# Patient Record
Sex: MALE | Race: ASIAN | NOT HISPANIC OR LATINO | URBAN - METROPOLITAN AREA
[De-identification: names, ages, dates, MRNs, and addresses within clinical notes are randomized per-mention and may not be internally consistent; named-entity substitution may affect disease eponyms.]

---

## 2023-05-16 ENCOUNTER — INPATIENT (INPATIENT)
Facility: HOSPITAL | Age: 83
LOS: 7 days | Discharge: HOME HEALTH | DRG: 603 | End: 2023-05-24
Attending: STUDENT IN AN ORGANIZED HEALTH CARE EDUCATION/TRAINING PROGRAM | Admitting: HOSPITALIST
Payer: MEDICARE

## 2023-05-16 VITALS
HEIGHT: 69 IN | OXYGEN SATURATION: 96 % | HEART RATE: 89 BPM | WEIGHT: 171.96 LBS | SYSTOLIC BLOOD PRESSURE: 119 MMHG | DIASTOLIC BLOOD PRESSURE: 71 MMHG | RESPIRATION RATE: 20 BRPM | TEMPERATURE: 100 F

## 2023-05-16 DIAGNOSIS — M10.9 GOUT, UNSPECIFIED: ICD-10-CM

## 2023-05-16 DIAGNOSIS — R35.0 FREQUENCY OF MICTURITION: ICD-10-CM

## 2023-05-16 DIAGNOSIS — L03.90 CELLULITIS, UNSPECIFIED: ICD-10-CM

## 2023-05-16 DIAGNOSIS — E03.9 HYPOTHYROIDISM, UNSPECIFIED: ICD-10-CM

## 2023-05-16 DIAGNOSIS — M79.672 PAIN IN LEFT FOOT: ICD-10-CM

## 2023-05-16 DIAGNOSIS — I10 ESSENTIAL (PRIMARY) HYPERTENSION: ICD-10-CM

## 2023-05-16 DIAGNOSIS — R60.0 LOCALIZED EDEMA: ICD-10-CM

## 2023-05-16 DIAGNOSIS — Z29.9 ENCOUNTER FOR PROPHYLACTIC MEASURES, UNSPECIFIED: ICD-10-CM

## 2023-05-16 DIAGNOSIS — Z86.69 PERSONAL HISTORY OF OTHER DISEASES OF THE NERVOUS SYSTEM AND SENSE ORGANS: Chronic | ICD-10-CM

## 2023-05-16 DIAGNOSIS — E78.5 HYPERLIPIDEMIA, UNSPECIFIED: ICD-10-CM

## 2023-05-16 LAB
APTT BLD: 30.8 SEC — SIGNIFICANT CHANGE UP (ref 27.5–35.5)
BASOPHILS # BLD AUTO: 0.03 K/UL — SIGNIFICANT CHANGE UP (ref 0–0.2)
BASOPHILS NFR BLD AUTO: 0.2 % — SIGNIFICANT CHANGE UP (ref 0–2)
CRP SERPL-MCNC: 192 MG/L — HIGH (ref 0–4)
EOSINOPHIL # BLD AUTO: 0 K/UL — SIGNIFICANT CHANGE UP (ref 0–0.5)
EOSINOPHIL NFR BLD AUTO: 0 % — SIGNIFICANT CHANGE UP (ref 0–6)
HCT VFR BLD CALC: 39.9 % — SIGNIFICANT CHANGE UP (ref 39–50)
HGB BLD-MCNC: 13.6 G/DL — SIGNIFICANT CHANGE UP (ref 13–17)
IMM GRANULOCYTES NFR BLD AUTO: 0.5 % — SIGNIFICANT CHANGE UP (ref 0–0.9)
INR BLD: 1.22 RATIO — HIGH (ref 0.88–1.16)
LYMPHOCYTES # BLD AUTO: 0.62 K/UL — LOW (ref 1–3.3)
LYMPHOCYTES # BLD AUTO: 4.7 % — LOW (ref 13–44)
MCHC RBC-ENTMCNC: 30.8 PG — SIGNIFICANT CHANGE UP (ref 27–34)
MCHC RBC-ENTMCNC: 34.1 GM/DL — SIGNIFICANT CHANGE UP (ref 32–36)
MCV RBC AUTO: 90.5 FL — SIGNIFICANT CHANGE UP (ref 80–100)
MONOCYTES # BLD AUTO: 1.02 K/UL — HIGH (ref 0–0.9)
MONOCYTES NFR BLD AUTO: 7.7 % — SIGNIFICANT CHANGE UP (ref 2–14)
NEUTROPHILS # BLD AUTO: 11.53 K/UL — HIGH (ref 1.8–7.4)
NEUTROPHILS NFR BLD AUTO: 86.9 % — HIGH (ref 43–77)
NRBC # BLD: 0 /100 WBCS — SIGNIFICANT CHANGE UP (ref 0–0)
NT-PROBNP SERPL-SCNC: 222 PG/ML — SIGNIFICANT CHANGE UP (ref 0–300)
PLATELET # BLD AUTO: 243 K/UL — SIGNIFICANT CHANGE UP (ref 150–400)
PROTHROM AB SERPL-ACNC: 14.1 SEC — HIGH (ref 10.5–13.4)
RBC # BLD: 4.41 M/UL — SIGNIFICANT CHANGE UP (ref 4.2–5.8)
RBC # FLD: 13.1 % — SIGNIFICANT CHANGE UP (ref 10.3–14.5)
TROPONIN T, HIGH SENSITIVITY RESULT: 23 NG/L — SIGNIFICANT CHANGE UP (ref 0–51)
URATE SERPL-MCNC: 8.2 MG/DL — SIGNIFICANT CHANGE UP (ref 3.4–8.8)
WBC # BLD: 13.26 K/UL — HIGH (ref 3.8–10.5)
WBC # FLD AUTO: 13.26 K/UL — HIGH (ref 3.8–10.5)

## 2023-05-16 PROCEDURE — 99223 1ST HOSP IP/OBS HIGH 75: CPT

## 2023-05-16 PROCEDURE — 73630 X-RAY EXAM OF FOOT: CPT | Mod: 26,LT

## 2023-05-16 PROCEDURE — 99285 EMERGENCY DEPT VISIT HI MDM: CPT | Mod: GC

## 2023-05-16 PROCEDURE — 93970 EXTREMITY STUDY: CPT | Mod: 26

## 2023-05-16 RX ORDER — ONDANSETRON 8 MG/1
4 TABLET, FILM COATED ORAL EVERY 8 HOURS
Refills: 0 | Status: DISCONTINUED | OUTPATIENT
Start: 2023-05-16 | End: 2023-05-24

## 2023-05-16 RX ORDER — METOPROLOL TARTRATE 50 MG
50 TABLET ORAL DAILY
Refills: 0 | Status: DISCONTINUED | OUTPATIENT
Start: 2023-05-16 | End: 2023-05-24

## 2023-05-16 RX ORDER — CEFAZOLIN SODIUM 1 G
1000 VIAL (EA) INJECTION ONCE
Refills: 0 | Status: COMPLETED | OUTPATIENT
Start: 2023-05-16 | End: 2023-05-16

## 2023-05-16 RX ORDER — LEVOTHYROXINE SODIUM 125 MCG
1 TABLET ORAL
Refills: 0 | DISCHARGE

## 2023-05-16 RX ORDER — LANOLIN ALCOHOL/MO/W.PET/CERES
3 CREAM (GRAM) TOPICAL AT BEDTIME
Refills: 0 | Status: DISCONTINUED | OUTPATIENT
Start: 2023-05-16 | End: 2023-05-24

## 2023-05-16 RX ORDER — DORZOLAMIDE HYDROCHLORIDE 20 MG/ML
1 SOLUTION/ DROPS OPHTHALMIC
Refills: 0 | DISCHARGE

## 2023-05-16 RX ORDER — COLCHICINE 0.6 MG
0.6 TABLET ORAL
Refills: 0 | Status: DISCONTINUED | OUTPATIENT
Start: 2023-05-16 | End: 2023-05-22

## 2023-05-16 RX ORDER — ACETAMINOPHEN 500 MG
650 TABLET ORAL EVERY 6 HOURS
Refills: 0 | Status: DISCONTINUED | OUTPATIENT
Start: 2023-05-16 | End: 2023-05-18

## 2023-05-16 RX ORDER — DORZOLAMIDE HYDROCHLORIDE 20 MG/ML
1 SOLUTION/ DROPS OPHTHALMIC
Refills: 0 | Status: DISCONTINUED | OUTPATIENT
Start: 2023-05-16 | End: 2023-05-24

## 2023-05-16 RX ORDER — ENOXAPARIN SODIUM 100 MG/ML
40 INJECTION SUBCUTANEOUS EVERY 24 HOURS
Refills: 0 | Status: DISCONTINUED | OUTPATIENT
Start: 2023-05-16 | End: 2023-05-24

## 2023-05-16 RX ORDER — AMLODIPINE BESYLATE 2.5 MG/1
1 TABLET ORAL
Refills: 0 | DISCHARGE

## 2023-05-16 RX ORDER — AMLODIPINE BESYLATE 2.5 MG/1
5 TABLET ORAL DAILY
Refills: 0 | Status: DISCONTINUED | OUTPATIENT
Start: 2023-05-16 | End: 2023-05-24

## 2023-05-16 RX ORDER — LEVOTHYROXINE SODIUM 125 MCG
100 TABLET ORAL DAILY
Refills: 0 | Status: DISCONTINUED | OUTPATIENT
Start: 2023-05-16 | End: 2023-05-24

## 2023-05-16 RX ORDER — METOPROLOL TARTRATE 50 MG
1 TABLET ORAL
Refills: 0 | DISCHARGE

## 2023-05-16 RX ORDER — CEFAZOLIN SODIUM 1 G
1000 VIAL (EA) INJECTION EVERY 8 HOURS
Refills: 0 | Status: DISCONTINUED | OUTPATIENT
Start: 2023-05-16 | End: 2023-05-22

## 2023-05-16 RX ORDER — COLCHICINE 0.6 MG
0.6 TABLET ORAL ONCE
Refills: 0 | Status: COMPLETED | OUTPATIENT
Start: 2023-05-16 | End: 2023-05-16

## 2023-05-16 RX ORDER — ATORVASTATIN CALCIUM 80 MG/1
40 TABLET, FILM COATED ORAL AT BEDTIME
Refills: 0 | Status: DISCONTINUED | OUTPATIENT
Start: 2023-05-16 | End: 2023-05-23

## 2023-05-16 RX ADMIN — Medication 100 MILLIGRAM(S): at 18:48

## 2023-05-16 RX ADMIN — Medication 0.6 MILLIGRAM(S): at 18:47

## 2023-05-16 NOTE — H&P ADULT - ASSESSMENT
82 year old male with a PMHx of hyperlipidemia, hypertension, hypothyroidism, and gout presented with right leg cellulitis, left intrapatellar bursitiis, and acute gout left foot.

## 2023-05-16 NOTE — H&P ADULT - PROBLEM SELECTOR PLAN 3
Increased urinary frequency, attributed to keflex. Pt states it has improved since stopping keflex one day ago  - obtain u/a to rule out UTI Likely in the setting of infection and acute gout.   EKG shows Q waves in leads III, AVL. No prior EKGs available to compare.  - will obtain baseline TTE   - no acute symptoms in regards to chest pain or SOB

## 2023-05-16 NOTE — ED PROVIDER NOTE - CLINICAL SUMMARY MEDICAL DECISION MAKING FREE TEXT BOX
82-year-old male with a swollen rt calf and  rt  knee  for 1 week and now presented with the left   great toe swelling redness and pain , most likely cellulitis of the   , right leg ,concern for gout of the left great toe    will obtain blood work ultrasounds of the   right calf. x-ray of the left foot blood work and reassess ZR

## 2023-05-16 NOTE — ED ADULT NURSE NOTE - OBJECTIVE STATEMENT
83 y/o male with PMH of HTN, HLD, arrives to the ER complaining of lower leg pain. Pt reports having bilateral leg pain since May 7th. Reports pain started on R foot first then migrate to the L foot. Pt went to , currently on Keflex for a week now for cellulitis, but reports severe pain, no being able to ambulate.   Pt denies SOB, chest pain, dizziness, N/V/D.  On assessment pt is well appearing, A&Ox4, speaking coherently, airway is patent, breathing spontaneously and unlabored. Skin is dry, warm. Abdomen is soft, no distended, no tender. Full ROM in all extremities, R foot redness and swelling, L foot less redness noted.   Patient undressed and placed into gown, side rails up with bed locked and in lowest position for safety. call bell within reach. [place] provided. Comfort and safety provided. will continue to reassess.

## 2023-05-16 NOTE — ED PROVIDER NOTE - PATIENT PORTAL LINK FT
You can access the FollowMyHealth Patient Portal offered by [facility] by registering at the following website: [email or web address]. By joining [employer]’s FollowMyHealth portal, you will also be able to view your health information using other applications (apps) compatible with our system.

## 2023-05-16 NOTE — H&P ADULT - PROBLEM SELECTOR PLAN 1
Partially improved with outpatient keflex  - continue with cefazolin 1 g q8hrs  - monitor for improvement

## 2023-05-16 NOTE — H&P ADULT - PROBLEM SELECTOR PLAN 6
- continue home synthroid 100 mcg daily - takes simvastatin at home; while admitted c/w atorvastatin

## 2023-05-16 NOTE — H&P ADULT - SKIN COMMENTS
+erythema about the left great toe and across the dorsal service of the left foot. there is tenderness about the right toe to palpation. Left sided intrapatellar bursitis

## 2023-05-16 NOTE — ED PROVIDER NOTE - ATTENDING CONTRIBUTION TO CARE
Refill  Zolpidem 10 mg tab     Pt states there are more refills he needs but was unsure on what    look at MDM

## 2023-05-16 NOTE — H&P ADULT - NSHPPHYSICALEXAM_GEN_ALL_CORE
Vital Signs Last 24 Hrs  T(C): 37.7 (16 May 2023 19:45), Max: 37.7 (16 May 2023 14:52)  T(F): 99.9 (16 May 2023 19:45), Max: 99.9 (16 May 2023 19:45)  HR: 77 (16 May 2023 19:45) (77 - 89)  BP: 127/67 (16 May 2023 19:45) (119/71 - 132/75)  BP(mean): --  RR: 18 (16 May 2023 19:45) (18 - 20)  SpO2: 99% (16 May 2023 19:45) (96% - 99%)    Parameters below as of 16 May 2023 19:45  Patient On (Oxygen Delivery Method): room air

## 2023-05-16 NOTE — ED PROVIDER NOTE - MUSCULOSKELETAL, MLM
tenderness and pain patella on rt knee ,swollen rt calf with mild redness ,red and swollen l great toe

## 2023-05-16 NOTE — ED ADULT NURSE NOTE - NSFALLUNIVINTERV_ED_ALL_ED
Bed/Stretcher in lowest position, wheels locked, appropriate side rails in place/Call bell, personal items and telephone in reach/Instruct patient to call for assistance before getting out of bed/chair/stretcher/Non-slip footwear applied when patient is off stretcher/[place] to call system/Physically safe environment - no spills, clutter or unnecessary equipment/Purposeful proactive rounding/Room/bathroom lighting operational, light cord in reach

## 2023-05-16 NOTE — ED ADULT NURSE REASSESSMENT NOTE - NS ED NURSE REASSESS COMMENT FT1
Report received from GISELA Clement. PT A,Ox4, denies any pain/discomfort at time, pt resting comfortably in stretcher, VSS, pt pending dispo, stretcher locked in lowest position, side rails up for safety.

## 2023-05-16 NOTE — H&P ADULT - PROBLEM SELECTOR PLAN 2
Gout flare in the setting of acute infection. Pt has no history of recurrent gout infections  - s/p colchicine 0.6mg PO x1  - continue with colchicine 0.6 mg PO BID

## 2023-05-16 NOTE — H&P ADULT - HISTORY OF PRESENT ILLNESS
82 year old male with a PMHx of hyperlipidemia, hypertension, and gout presented with the left and right leg swelling for 1 week.      ED interventions: cefazolin 1000 mg IV x1, colchicine 0.6mg PO x1. 82 year old male with a PMHx of hyperlipidemia, hypertension, hypothyroidism, and gout presented with leg swelling. About one week ago, pt states he was out gardening and taking care of his yard, felt he had a lot of dirt/leaves/etc stuck in his shoe in the process. Soon after, he developed swelling and erythema about the left calf area. He was treated with an outpatient course of keflex, which he took for about five days. The symptoms of the right leg improved but not completely. About 2-3 days prior to presentation, the patient felt that his left foot started to become erythematous and swollen as well. There is associated pain in the bilateral feet, to the point where it was difficult for him to ambulate comfortably. Generally speaking, the patient has no history of recurrent cellulitis or frequent gout flares.    ED interventions: cefazolin 1000 mg IV x1, colchicine 0.6mg PO x1.

## 2023-05-16 NOTE — H&P ADULT - PROBLEM SELECTOR PLAN 4
- continue home amlodipine 5mg daily  - continue home metoprolol succinate 50 mg daily Increased urinary frequency, attributed to keflex. Pt states it has improved since stopping keflex one day ago  - obtain u/a to rule out UTI

## 2023-05-16 NOTE — H&P ADULT - NSHPLABSRESULTS_GEN_ALL_CORE
LABS:                         13.6   13.26 )-----------( 243      ( 16 May 2023 16:33 )             39.9     05-16    135  |  98  |  31<H>  ----------------------------<  131<H>  4.3   |  23  |  1.70<H>    Ca    9.7      16 May 2023 16:33    TPro  8.6<H>  /  Alb  4.1  /  TBili  0.9  /  DBili  x   /  AST  33  /  ALT  35  /  AlkPhos  67  05-16    PT/INR - ( 16 May 2023 16:33 )   PT: 14.1 sec;   INR: 1.22 ratio         PTT - ( 16 May 2023 16:33 )  PTT:30.8 sec            Records reviewed from prior hospitalization.  Labs reviewed remarkable for - leukocytosis with increased neutrophils. BUN/Cr 31/1.70.  EKG personally reviewed   CXR personally reviewed LABS:                         13.6   13.26 )-----------( 243      ( 16 May 2023 16:33 )             39.9     05-16    135  |  98  |  31<H>  ----------------------------<  131<H>  4.3   |  23  |  1.70<H>    Ca    9.7      16 May 2023 16:33    TPro  8.6<H>  /  Alb  4.1  /  TBili  0.9  /  DBili  x   /  AST  33  /  ALT  35  /  AlkPhos  67  05-16    PT/INR - ( 16 May 2023 16:33 )   PT: 14.1 sec;   INR: 1.22 ratio         PTT - ( 16 May 2023 16:33 )  PTT:30.8 sec            Records reviewed from prior hospitalization.  Labs reviewed remarkable for - leukocytosis with increased neutrophils. BUN/Cr 31/1.70.  EKG personally reviewed - NSR LABS:                         13.6   13.26 )-----------( 243      ( 16 May 2023 16:33 )             39.9     05-16    135  |  98  |  31<H>  ----------------------------<  131<H>  4.3   |  23  |  1.70<H>    Ca    9.7      16 May 2023 16:33    TPro  8.6<H>  /  Alb  4.1  /  TBili  0.9  /  DBili  x   /  AST  33  /  ALT  35  /  AlkPhos  67  05-16    PT/INR - ( 16 May 2023 16:33 )   PT: 14.1 sec;   INR: 1.22 ratio         PTT - ( 16 May 2023 16:33 )  PTT:30.8 sec            Records reviewed from prior hospitalization.  Labs reviewed remarkable for - leukocytosis with increased neutrophils. BUN/Cr 31/1.70.  EKG personally reviewed - NSR, Q waves lead III, AVL

## 2023-05-16 NOTE — ED PROVIDER NOTE - CHIEF COMPLAINT
The patient is a 82y Female complaining of pain, lower leg. The patient is a 82  82 y old male  complaining of pain, lower leg.

## 2023-05-16 NOTE — H&P ADULT - PROBLEM SELECTOR PLAN 5
- takes simvastatin at home; while admitted c/w atorvastatin - continue home amlodipine 5mg daily  - continue home metoprolol succinate 50 mg daily

## 2023-05-16 NOTE — ED ADULT TRIAGE NOTE - CHIEF COMPLAINT QUOTE
Right lower leg cellulitis, treated with Keflex x 6 days. Reports improvement of inflammation but reports continued pain in the right lower leg and now also developed pain in the left lower leg.

## 2023-05-16 NOTE — ED PROVIDER NOTE - OBJECTIVE STATEMENT
82-year-old male with a history of hyperlipidemia, hypertension, gout, presented with the left and right leg swelling for 1 week    he was working in the garden last Wednesday  and noticed pain and swelling of the left calf as well on the left knee after he was kneeling on that leg  he was getting Keflex for 5 days for possible cellulitis and today noticed redness and swelling of the great toe of the left foot and difficulty walking   temperature is 98.5 no more. denies nausea or vomiting chest pain or shortness of breath

## 2023-05-16 NOTE — ED PROVIDER NOTE - RAPID ASSESSMENT
82 male history HTN HLD presents with leg pain and swelling.  Patient reports redness and swelling to right calf/foot that began 1 week ago while gardening, went to urgent care last Wednesday and has been taking Keflex for cellulitis for 6 days.  Reports worsening pain/swelling to RLE and now has swelling and pain into LLE.  Does also report tactile fever.  Denies prior DVT.  Denies recent flight/immobilization.  Non-smoker.  Reports active lifestyle.  Denies CP, palpitations, dizziness, BARNES.  RLE 2+ pitting warm/erythematous foot, LLE +1 pitting edema tender    IJose Alberto PA-C saw patient as a rapid assessment initially via telemedicine encounter. The rest of care to be performed by the primary ED team. Receiving team will follow up on labs, analgesia, any clinical imaging, and perform reassessment and disposition of the patient as clinically indicated. All decisions regarding the progression of care will be made at their discretion. 82 male history HTN HLD presents with leg pain and swelling.  Patient reports redness and swelling to right calf/foot that began 1 week ago while gardening, went to urgent care last Wednesday and has been taking Keflex for cellulitis for 6 days.  Reports worsening pain/swelling to RLE and now has swelling and pain into LLE.  Does also report tactile fever.  Denies prior DVT.  Denies recent flight/immobilization.  Non-smoker.  Reports active lifestyle.  Denies CP, palpitations, dizziness, BARNES.  RLE 2+ pitting warm/erythematous foot, LLE +1 pitting edema tender    Jose Alberto MENDOZA PA-C saw patient as a rapid assessment initially via telemedicine encounter. The rest of care to be performed by the primary ED team. Receiving team will follow up on labs, analgesia, any clinical imaging, and perform reassessment and disposition of the patient as clinically indicated. All decisions regarding the progression of care will be made at their discretion.    Attending MD Arias: This patient was seen and orders were placed by the PA as per our department's QPA model.  I was not consulted in regards to this patient although I was present and available in the Emergency Department to the PA.  Patient was to be sent to main ED for full medical evaluation and receiving team was to follow up on any labs, analgesia, clinical imaging ordered by the PA.  Any reassessment and disposition decisions were to be made by receiving team as clinically indicated, all decisions regarding the progression of care to be made at their discretion.  I did not perform a comprehensive history and physical on this patient.

## 2023-05-17 DIAGNOSIS — N17.9 ACUTE KIDNEY FAILURE, UNSPECIFIED: ICD-10-CM

## 2023-05-17 LAB
A1C WITH ESTIMATED AVERAGE GLUCOSE RESULT: 6.1 % — HIGH (ref 4–5.6)
ANION GAP SERPL CALC-SCNC: 14 MMOL/L — SIGNIFICANT CHANGE UP (ref 5–17)
APPEARANCE UR: CLEAR — SIGNIFICANT CHANGE UP
BACTERIA # UR AUTO: NEGATIVE — SIGNIFICANT CHANGE UP
BASOPHILS # BLD AUTO: 0.05 K/UL — SIGNIFICANT CHANGE UP (ref 0–0.2)
BASOPHILS NFR BLD AUTO: 0.5 % — SIGNIFICANT CHANGE UP (ref 0–2)
BILIRUB UR-MCNC: NEGATIVE — SIGNIFICANT CHANGE UP
BUN SERPL-MCNC: 29 MG/DL — HIGH (ref 7–23)
CALCIUM SERPL-MCNC: 9 MG/DL — SIGNIFICANT CHANGE UP (ref 8.4–10.5)
CHLORIDE SERPL-SCNC: 99 MMOL/L — SIGNIFICANT CHANGE UP (ref 96–108)
CO2 SERPL-SCNC: 21 MMOL/L — LOW (ref 22–31)
COLOR SPEC: SIGNIFICANT CHANGE UP
CREAT ?TM UR-MCNC: 118 MG/DL — SIGNIFICANT CHANGE UP
CREAT SERPL-MCNC: 1.45 MG/DL — HIGH (ref 0.5–1.3)
DIFF PNL FLD: ABNORMAL
EGFR: 48 ML/MIN/1.73M2 — LOW
EOSINOPHIL # BLD AUTO: 0.05 K/UL — SIGNIFICANT CHANGE UP (ref 0–0.5)
EOSINOPHIL NFR BLD AUTO: 0.5 % — SIGNIFICANT CHANGE UP (ref 0–6)
EPI CELLS # UR: 0 /HPF — SIGNIFICANT CHANGE UP
ERYTHROCYTE [SEDIMENTATION RATE] IN BLOOD: 88 MM/HR — HIGH (ref 0–20)
ESTIMATED AVERAGE GLUCOSE: 128 MG/DL — HIGH (ref 68–114)
GLUCOSE SERPL-MCNC: 97 MG/DL — SIGNIFICANT CHANGE UP (ref 70–99)
GLUCOSE UR QL: NEGATIVE — SIGNIFICANT CHANGE UP
HCT VFR BLD CALC: 36.3 % — LOW (ref 39–50)
HGB BLD-MCNC: 12.3 G/DL — LOW (ref 13–17)
HYALINE CASTS # UR AUTO: 0 /LPF — SIGNIFICANT CHANGE UP (ref 0–2)
IMM GRANULOCYTES NFR BLD AUTO: 0.4 % — SIGNIFICANT CHANGE UP (ref 0–0.9)
KETONES UR-MCNC: NEGATIVE — SIGNIFICANT CHANGE UP
LEUKOCYTE ESTERASE UR-ACNC: NEGATIVE — SIGNIFICANT CHANGE UP
LYMPHOCYTES # BLD AUTO: 1.56 K/UL — SIGNIFICANT CHANGE UP (ref 1–3.3)
LYMPHOCYTES # BLD AUTO: 14.2 % — SIGNIFICANT CHANGE UP (ref 13–44)
MAGNESIUM SERPL-MCNC: 2.3 MG/DL — SIGNIFICANT CHANGE UP (ref 1.6–2.6)
MCHC RBC-ENTMCNC: 30.3 PG — SIGNIFICANT CHANGE UP (ref 27–34)
MCHC RBC-ENTMCNC: 33.9 GM/DL — SIGNIFICANT CHANGE UP (ref 32–36)
MCV RBC AUTO: 89.4 FL — SIGNIFICANT CHANGE UP (ref 80–100)
MONOCYTES # BLD AUTO: 1.37 K/UL — HIGH (ref 0–0.9)
MONOCYTES NFR BLD AUTO: 12.5 % — SIGNIFICANT CHANGE UP (ref 2–14)
NEUTROPHILS # BLD AUTO: 7.9 K/UL — HIGH (ref 1.8–7.4)
NEUTROPHILS NFR BLD AUTO: 71.9 % — SIGNIFICANT CHANGE UP (ref 43–77)
NITRITE UR-MCNC: NEGATIVE — SIGNIFICANT CHANGE UP
NRBC # BLD: 0 /100 WBCS — SIGNIFICANT CHANGE UP (ref 0–0)
OSMOLALITY UR: 582 MOS/KG — SIGNIFICANT CHANGE UP (ref 300–900)
PH UR: 6 — SIGNIFICANT CHANGE UP (ref 5–8)
PHOSPHATE SERPL-MCNC: 3 MG/DL — SIGNIFICANT CHANGE UP (ref 2.5–4.5)
PLATELET # BLD AUTO: 225 K/UL — SIGNIFICANT CHANGE UP (ref 150–400)
POTASSIUM SERPL-MCNC: 3.9 MMOL/L — SIGNIFICANT CHANGE UP (ref 3.5–5.3)
POTASSIUM SERPL-SCNC: 3.9 MMOL/L — SIGNIFICANT CHANGE UP (ref 3.5–5.3)
POTASSIUM UR-SCNC: 63 MMOL/L — SIGNIFICANT CHANGE UP
PROT ?TM UR-MCNC: 25 MG/DL — HIGH (ref 0–12)
PROT UR-MCNC: ABNORMAL
PROT/CREAT UR-RTO: 0.2 RATIO — SIGNIFICANT CHANGE UP (ref 0–0.2)
RBC # BLD: 4.06 M/UL — LOW (ref 4.2–5.8)
RBC # FLD: 13.1 % — SIGNIFICANT CHANGE UP (ref 10.3–14.5)
RBC CASTS # UR COMP ASSIST: 3 /HPF — SIGNIFICANT CHANGE UP (ref 0–4)
SODIUM SERPL-SCNC: 134 MMOL/L — LOW (ref 135–145)
SODIUM UR-SCNC: 37 MMOL/L — SIGNIFICANT CHANGE UP
SP GR SPEC: 1.02 — SIGNIFICANT CHANGE UP (ref 1.01–1.02)
UROBILINOGEN FLD QL: NEGATIVE — SIGNIFICANT CHANGE UP
WBC # BLD: 10.97 K/UL — HIGH (ref 3.8–10.5)
WBC # FLD AUTO: 10.97 K/UL — HIGH (ref 3.8–10.5)
WBC UR QL: 0 /HPF — SIGNIFICANT CHANGE UP (ref 0–5)

## 2023-05-17 PROCEDURE — 93306 TTE W/DOPPLER COMPLETE: CPT | Mod: 26

## 2023-05-17 PROCEDURE — 99232 SBSQ HOSP IP/OBS MODERATE 35: CPT

## 2023-05-17 RX ORDER — SODIUM CHLORIDE 9 MG/ML
1000 INJECTION, SOLUTION INTRAVENOUS
Refills: 0 | Status: DISCONTINUED | OUTPATIENT
Start: 2023-05-17 | End: 2023-05-18

## 2023-05-17 RX ORDER — BNT162B2 ORIGINAL AND OMICRON BA.4/BA.5 .1125; .1125 MG/2.25ML; MG/2.25ML
0.3 INJECTION, SUSPENSION INTRAMUSCULAR ONCE
Refills: 0 | Status: DISCONTINUED | OUTPATIENT
Start: 2023-05-17 | End: 2023-05-24

## 2023-05-17 RX ORDER — CHLORHEXIDINE GLUCONATE 213 G/1000ML
1 SOLUTION TOPICAL
Refills: 0 | Status: DISCONTINUED | OUTPATIENT
Start: 2023-05-17 | End: 2023-05-24

## 2023-05-17 RX ADMIN — Medication 100 MICROGRAM(S): at 05:29

## 2023-05-17 RX ADMIN — Medication 100 MILLIGRAM(S): at 13:51

## 2023-05-17 RX ADMIN — ATORVASTATIN CALCIUM 40 MILLIGRAM(S): 80 TABLET, FILM COATED ORAL at 21:36

## 2023-05-17 RX ADMIN — Medication 100 MILLIGRAM(S): at 05:29

## 2023-05-17 RX ADMIN — SODIUM CHLORIDE 75 MILLILITER(S): 9 INJECTION, SOLUTION INTRAVENOUS at 18:54

## 2023-05-17 RX ADMIN — DORZOLAMIDE HYDROCHLORIDE 1 DROP(S): 20 SOLUTION/ DROPS OPHTHALMIC at 21:35

## 2023-05-17 RX ADMIN — DORZOLAMIDE HYDROCHLORIDE 1 DROP(S): 20 SOLUTION/ DROPS OPHTHALMIC at 08:41

## 2023-05-17 RX ADMIN — Medication 50 MILLIGRAM(S): at 05:29

## 2023-05-17 RX ADMIN — Medication 100 MILLIGRAM(S): at 21:37

## 2023-05-17 RX ADMIN — Medication 0.6 MILLIGRAM(S): at 21:36

## 2023-05-17 RX ADMIN — AMLODIPINE BESYLATE 5 MILLIGRAM(S): 2.5 TABLET ORAL at 05:29

## 2023-05-17 RX ADMIN — Medication 0.6 MILLIGRAM(S): at 05:29

## 2023-05-17 RX ADMIN — ENOXAPARIN SODIUM 40 MILLIGRAM(S): 100 INJECTION SUBCUTANEOUS at 05:29

## 2023-05-17 NOTE — PROGRESS NOTE ADULT - PROBLEM SELECTOR PLAN 1
Partially improved with outpatient keflex  - continue with cefazolin 1 g q8hrs  - improving overall  - discussed with ID - can switch to cefadroxil 500mg PO BID (renally adjusted) to complete total 7d course on 5/22 when ready for dc

## 2023-05-17 NOTE — ED ADULT NURSE REASSESSMENT NOTE - NS ED NURSE REASSESS COMMENT FT1
Pt A,Ox4, denies any pain at time, pt resting comfortably in stretcher, VSS, pt provided meal and drink, pending admission. Stretcher locked in lowest position, side rails up for safety.

## 2023-05-17 NOTE — CONSULT NOTE ADULT - SUBJECTIVE AND OBJECTIVE BOX
[facility] DIVISION OF INFECTIOUS DISEASES  TRAE Vargas S. Shah, Y. Patel, G. [facility]  [phone]  ([phone] - weekdays after 5pm and weekends)    OCTAVIO OTTO  82y, Male  [id number]    HPI:  Patient is a 82 year old male with a PMH of hyperlipidemia, hypertension, hypothyroidism, and gout who presented with leg swelling. Patient reports he was cleaning his backyard about 2 weeks ago and he had a lot of leaves, dirt and twigs around and some got stuck inside his shoe in the process of cleaning. He noted some swelling and erythema with pain in the R foot, he was given cephalexin 500mg PO Q6h for about 5 days and noted erythema resolved but edema persisted. Denies history of lower extremity edema prior to this. He states shortly after he noted swelling with erythema and pain around his great toe and forefoot area; states son is a doctor and felt it was likely gout. Patient reports no gout flare in years. He then noted that it was more swollen, erythematous and painful and states he also then started to have pain in both feet yesterday with difficulty ambulating and so he came to the ER. He denies any history of cellulitis in the past. Patient seen and examined at bedside this morning in the ER.   ROS: 14 point review of systems completed, pertinent positives and negatives as per HPI.    Allergies: No Known Drug Allergies  Shrimp (Hives)    PMH -- Hypertension  HLD (hyperlipidemia)  Gout  Hypothyroidism    PSH -- History of retinal detachment  FH -- noncontributory   Social History -- denies tobacco, alcohol or illicit drug use    Physical Exam--  Vital Signs Last 24 Hrs  T(F): 98.3 (17 May 2023 05:07), Max: 99.9 (16 May 2023 19:45)  HR: 70 (17 May 2023 05:07) (70 - 89)  BP: 118/66 (17 May 2023 05:07) (118/66 - 132/75)  RR: 18 (17 May 2023 05:07) (18 - 20)  SpO2: 94% (17 May 2023 05:07) (94% - 99%)  General: no acute distress  HEENT: NC/AT, EOMI, anicteric, neck supple  Lungs: Clear bilaterally without rales, wheezing or rhonchi  Heart: S1, S2 present, RRR. No murmur, rub or gallop.  Abdomen: Soft. Nondistended. Nontender. BS present.   Neuro: AAOx3, no obvious focal deficits   Extremities: No cyanosis or clubbing. B/l LE pitting edema.   Skin: Mild left great toe erythema with medial foot erythema with no TTP, RLE with no erythema, dry cracked skin on heels  Psychiatric: Appropriate affect and mood for situation.   Lines: PIV    Laboratory & Imaging Data--  CBC:                       12.3   10.97 )-----------( 225      ( 17 May 2023 07:21 )             36.3     WBC Count: 10.97 K/uL (05-17-23 @ 07:21)  WBC Count: 13.26 K/uL (05-16-23 @ 16:33)    CMP: 05-17    134<L>  |  99  |  29<H>  ----------------------------<  97  3.9   |  21<L>  |  1.45<H>    Ca    9.0      17 May 2023 07:21  Phos  3.0     05-17  Mg     2.3     05-17    TPro  8.6<H>  /  Alb  4.1  /  TBili  0.9  /  DBili  x   /  AST  33  /  ALT  35  /  AlkPhos  67  05-16    LIVER FUNCTIONS - ( 16 May 2023 16:33 )  Alb: 4.1 g/dL / Pro: 8.6 g/dL / ALK PHOS: 67 U/L / ALT: 35 U/L / AST: 33 U/L / GGT: x             Microbiology: reviewed    Radiology--reviewed  < from: [facility] Duplex Lower Ext Vein Scan, Bilat (05.16.23 @ 19:42) >  IMPRESSION:  No evidence of deep venous thrombosis in either lower extremity.    < end of copied text >  < from: Xray Foot AP + Lateral + Oblique, Left (05.16.23 @ 17:33) >    FINDINGS:  No acute fracture or dislocation.  Slight erosion of the medial aspect of the head of the first metatarsal   head and the base of the first proximal phalanx with associated soft   tissue prominence consistent with the history of gout. There is a large   spur at the lateral base of the first proximal phalanx. There is also   marked spurring at the Achilles insertion and mild spurring at the   plantar fascial origin. There is a bipartite tibial hallux sesamoid.    IMPRESSION:  Small erosions at the medial aspect of the first metatarsophalangeal   joint consistent with the history of gout.    < end of copied text >    Active Medications--  acetaminophen     Tablet .. 650 milliGRAM(s) Oral every 6 hours PRN  aluminum hydroxide/magnesium hydroxide/simethicone Suspension 30 milliLiter(s) Oral every 4 hours PRN  amLODIPine   Tablet 5 milliGRAM(s) Oral daily  atorvastatin 40 milliGRAM(s) Oral at bedtime  ceFAZolin   IVPB 1000 milliGRAM(s) IV Intermittent every 8 hours  colchicine 0.6 milliGRAM(s) Oral two times a day  dorzolamide 2% Ophthalmic Solution 1 Drop(s) Right EYE <User Schedule>  enoxaparin Injectable 40 milliGRAM(s) SubCutaneous every 24 hours  levothyroxine 100 MICROGram(s) Oral daily  melatonin 3 milliGRAM(s) Oral at bedtime PRN  metoprolol succinate ER 50 milliGRAM(s) Oral daily  ondansetron Injectable 4 milliGRAM(s) IV Push every 8 hours PRN    Current Antimicrobials:   ceFAZolin   IVPB 1000 milliGRAM(s) IV Intermittent every 8 hours    Prior/Completed Antimicrobials:  ceFAZolin   IVPB     [facility] DIV[facility] OF INFECTIOUS DISEASES  TRAE Vargas S. Shah, Y. Patel, G. [facility]  [phone]  ([phone] - weekdays after 5pm and weekends)    OCTAVIO OTTO  82y, Male  [id number]    HPI:  Patient is a 82 year old male with a PMH of hyperlipidemia, hypertension, hypothyroidism, and gout who presented with leg swelling. Patient reports he was cleaning his backyard about 2 weeks ago and he had a lot of leaves, dirt and twigs around and some got stuck inside his shoe in the process of cleaning. He noted some swelling and erythema with pain in the R foot, he was given cephalexin 500mg PO Q6h for about 5 days and noted erythema resolved but edema persisted. Denies history of lower extremity edema prior to this. He states shortly after he noted swelling with erythema and pain around his great toe and forefoot area; states son is a doctor and felt it was likely gout. Patient reports no gout flare in years. He then noted that it was more swollen, erythematous and painful and states he also then started to have pain in both feet yesterday with difficulty ambulating and so he came to the ER. He denies any history of cellulitis in the past. States he had some increased urinary frequency with no other gu symptoms while taking cephalexin, was going every 1.5-2h, and it resolved after he stopped taking the cephalexin a day ago. Patient seen and examined at bedside this morning in the ER.   ROS: 14 point review of systems completed, pertinent positives and negatives as per HPI.    Allergies: No Known Drug Allergies  Shrimp (Hives)    PMH -- Hypertension  HLD (hyperlipidemia)  Gout  Hypothyroidism    PSH -- History of retinal detachment  FH -- noncontributory   Social History -- denies tobacco, alcohol or illicit drug use    Physical Exam--  Vital Signs Last 24 Hrs  T(F): 98.3 (17 May 2023 05:07), Max: 99.9 (16 May 2023 19:45)  HR: 70 (17 May 2023 05:07) (70 - 89)  BP: 118/66 (17 May 2023 05:07) (118/66 - 132/75)  RR: 18 (17 May 2023 05:07) (18 - 20)  SpO2: 94% (17 May 2023 05:07) (94% - 99%)  General: no acute distress  HEENT: NC/AT, EOMI, anicteric, neck supple  Lungs: Clear bilaterally without rales, wheezing or rhonchi  Heart: S1, S2 present, RRR. No murmur, rub or gallop.  Abdomen: Soft. Nondistended. Nontender. BS present.   Neuro: AAOx3, no obvious focal deficits   Extremities: No cyanosis or clubbing. B/l LE pitting edema.   Skin: Mild left great toe erythema with medial foot erythema with no TTP, RLE with no erythema, dry cracked skin on heels  Psychiatric: Appropriate affect and mood for situation.   Lines: PIV    Laboratory & Imaging Data--  CBC:                       12.3   10.97 )-----------( 225      ( 17 May 2023 07:21 )             36.3     WBC Count: 10.97 K/uL (05-17-23 @ 07:21)  WBC Count: 13.26 K/uL (05-16-23 @ 16:33)    CMP: 05-17    134<L>  |  99  |  29<H>  ----------------------------<  97  3.9   |  21<L>  |  1.45<H>    Ca    9.0      17 May 2023 07:21  Phos  3.0     05-17  Mg     2.3     05-17    TPro  8.6<H>  /  Alb  4.1  /  TBili  0.9  /  DBili  x   /  AST  33  /  ALT  35  /  AlkPhos  67  05-16    LIVER FUNCTIONS - ( 16 May 2023 16:33 )  Alb: 4.1 g/dL / Pro: 8.6 g/dL / ALK PHOS: 67 U/L / ALT: 35 U/L / AST: 33 U/L / GGT: x             Microbiology: reviewed    Radiology--reviewed  < from: [facility] Duplex Lower Ext Vein Scan, Bilat (05.16.23 @ 19:42) >  IMPRESSION:  No evidence of deep venous thrombosis in either lower extremity.    < end of copied text >  < from: Xray Foot AP + Lateral + Oblique, Left (05.16.23 @ 17:33) >    FINDINGS:  No acute fracture or dislocation.  Slight erosion of the medial aspect of the head of the first metatarsal   head and the base of the first proximal phalanx with associated soft   tissue prominence consistent with the history of gout. There is a large   spur at the lateral base of the first proximal phalanx. There is also   marked spurring at the Achilles insertion and mild spurring at the   plantar fascial origin. There is a bipartite tibial hallux sesamoid.    IMPRESSION:  Small erosions at the medial aspect of the first metatarsophalangeal   joint consistent with the history of gout.    < end of copied text >    Active Medications--  acetaminophen     Tablet .. 650 milliGRAM(s) Oral every 6 hours PRN  aluminum hydroxide/magnesium hydroxide/simethicone Suspension 30 milliLiter(s) Oral every 4 hours PRN  amLODIPine   Tablet 5 milliGRAM(s) Oral daily  atorvastatin 40 milliGRAM(s) Oral at bedtime  ceFAZolin   IVPB 1000 milliGRAM(s) IV Intermittent every 8 hours  colchicine 0.6 milliGRAM(s) Oral two times a day  dorzolamide 2% Ophthalmic Solution 1 Drop(s) Right EYE <User Schedule>  enoxaparin Injectable 40 milliGRAM(s) SubCutaneous every 24 hours  levothyroxine 100 MICROGram(s) Oral daily  melatonin 3 milliGRAM(s) Oral at bedtime PRN  metoprolol succinate ER 50 milliGRAM(s) Oral daily  ondansetron Injectable 4 milliGRAM(s) IV Push every 8 hours PRN    Current Antimicrobials:   ceFAZolin   IVPB 1000 milliGRAM(s) IV Intermittent every 8 hours    Prior/Completed Antimicrobials:  ceFAZolin   IVPB

## 2023-05-17 NOTE — PATIENT PROFILE ADULT - FUNCTIONAL ASSESSMENT - BASIC MOBILITY 6.
3-calculated by average/Not able to assess (calculate score using [facility] averaging method)

## 2023-05-17 NOTE — CONSULT NOTE ADULT - ASSESSMENT
[facility]  Chart Reviewed-Full Consult to follow for any immediate concerns please feel free to contact us directly at [phone] and have us paged  Jeffry Allison MD   Patient is a 82 year old male with a PMH of hyperlipidemia, hypertension, hypothyroidism, and gout who presented withleg swelling with erythema and bilateral foot pain. Patient recently with RLE cellulitis after cleaning his backyard and was treated with cephalexin with resolution of erythema but persistent edema. He then developed L foot pain with erythema and swelling, thought to be gout. He also had pain in both feet with difficulty ambulating and so he came to the ER. Patient was admitted for L foot gout, LE cellulitis and L intrapatellar bursitis.     LLE gout flare with possible component of cellulitis   Leukocytosis likely due to above   Recent RLE cellulitis s/p cephalexin  B/l LE edema likely due to above   - b/l heels with some cracked dry skin; RLE with edema but no erythema  - L great toe with erythema and mild erythema -unable to rule out cellulitis  - had some urinary frequency with cephalexin which resolved after stopping it  - leukocytosis on admission downtrended now, afebrile  - started on cefazolin 1g IV Q8h -- continue for now   - on colchicine for gout per primary team  - continue to monitor -- if improve - can look at discharging on cefadroxil 500mg PO BID (renally adjusted) to complete total 7d course on 5/22 and follow up with [facility] as oupatient   - elevate b/l lower extremities   - monitor temps/WBC      D/w Dr. Maureen Allison M.D.  [facility], Division of Infectious Diseases  [phone]  After 5pm on weekdays and all day on weekends - please call [phone]

## 2023-05-18 LAB
ANION GAP SERPL CALC-SCNC: 12 MMOL/L — SIGNIFICANT CHANGE UP (ref 5–17)
BUN SERPL-MCNC: 23 MG/DL — SIGNIFICANT CHANGE UP (ref 7–23)
CALCIUM SERPL-MCNC: 8.6 MG/DL — SIGNIFICANT CHANGE UP (ref 8.4–10.5)
CHLORIDE SERPL-SCNC: 100 MMOL/L — SIGNIFICANT CHANGE UP (ref 96–108)
CO2 SERPL-SCNC: 21 MMOL/L — LOW (ref 22–31)
CREAT SERPL-MCNC: 1.29 MG/DL — SIGNIFICANT CHANGE UP (ref 0.5–1.3)
EGFR: 55 ML/MIN/1.73M2 — LOW
GLUCOSE SERPL-MCNC: 124 MG/DL — HIGH (ref 70–99)
HCT VFR BLD CALC: 37.1 % — LOW (ref 39–50)
HGB BLD-MCNC: 12 G/DL — LOW (ref 13–17)
MAGNESIUM SERPL-MCNC: 2.1 MG/DL — SIGNIFICANT CHANGE UP (ref 1.6–2.6)
MCHC RBC-ENTMCNC: 29.9 PG — SIGNIFICANT CHANGE UP (ref 27–34)
MCHC RBC-ENTMCNC: 32.3 GM/DL — SIGNIFICANT CHANGE UP (ref 32–36)
MCV RBC AUTO: 92.5 FL — SIGNIFICANT CHANGE UP (ref 80–100)
NRBC # BLD: 0 /100 WBCS — SIGNIFICANT CHANGE UP (ref 0–0)
PHOSPHATE SERPL-MCNC: 2.9 MG/DL — SIGNIFICANT CHANGE UP (ref 2.5–4.5)
PLATELET # BLD AUTO: 237 K/UL — SIGNIFICANT CHANGE UP (ref 150–400)
POTASSIUM SERPL-MCNC: 3.9 MMOL/L — SIGNIFICANT CHANGE UP (ref 3.5–5.3)
POTASSIUM SERPL-SCNC: 3.9 MMOL/L — SIGNIFICANT CHANGE UP (ref 3.5–5.3)
RBC # BLD: 4.01 M/UL — LOW (ref 4.2–5.8)
RBC # FLD: 12.9 % — SIGNIFICANT CHANGE UP (ref 10.3–14.5)
SODIUM SERPL-SCNC: 133 MMOL/L — LOW (ref 135–145)
UUN UR-MCNC: 1020 MG/DL — SIGNIFICANT CHANGE UP
WBC # BLD: 10.29 K/UL — SIGNIFICANT CHANGE UP (ref 3.8–10.5)
WBC # FLD AUTO: 10.29 K/UL — SIGNIFICANT CHANGE UP (ref 3.8–10.5)

## 2023-05-18 PROCEDURE — 99233 SBSQ HOSP IP/OBS HIGH 50: CPT

## 2023-05-18 PROCEDURE — 99221 1ST HOSP IP/OBS SF/LOW 40: CPT

## 2023-05-18 RX ORDER — SODIUM CHLORIDE 9 MG/ML
1000 INJECTION, SOLUTION INTRAVENOUS
Refills: 0 | Status: DISCONTINUED | OUTPATIENT
Start: 2023-05-18 | End: 2023-05-21

## 2023-05-18 RX ORDER — ACETAMINOPHEN 500 MG
975 TABLET ORAL EVERY 6 HOURS
Refills: 0 | Status: DISCONTINUED | OUTPATIENT
Start: 2023-05-18 | End: 2023-05-24

## 2023-05-18 RX ADMIN — CHLORHEXIDINE GLUCONATE 1 APPLICATION(S): 213 SOLUTION TOPICAL at 13:05

## 2023-05-18 RX ADMIN — ENOXAPARIN SODIUM 40 MILLIGRAM(S): 100 INJECTION SUBCUTANEOUS at 05:09

## 2023-05-18 RX ADMIN — Medication 0.6 MILLIGRAM(S): at 17:16

## 2023-05-18 RX ADMIN — DORZOLAMIDE HYDROCHLORIDE 1 DROP(S): 20 SOLUTION/ DROPS OPHTHALMIC at 21:14

## 2023-05-18 RX ADMIN — AMLODIPINE BESYLATE 5 MILLIGRAM(S): 2.5 TABLET ORAL at 05:11

## 2023-05-18 RX ADMIN — Medication 100 MILLIGRAM(S): at 21:14

## 2023-05-18 RX ADMIN — Medication 0.6 MILLIGRAM(S): at 05:09

## 2023-05-18 RX ADMIN — DORZOLAMIDE HYDROCHLORIDE 1 DROP(S): 20 SOLUTION/ DROPS OPHTHALMIC at 08:31

## 2023-05-18 RX ADMIN — Medication 50 MILLIGRAM(S): at 05:09

## 2023-05-18 RX ADMIN — Medication 100 MICROGRAM(S): at 05:09

## 2023-05-18 RX ADMIN — Medication 100 MILLIGRAM(S): at 05:09

## 2023-05-18 RX ADMIN — Medication 100 MILLIGRAM(S): at 13:06

## 2023-05-18 RX ADMIN — ATORVASTATIN CALCIUM 40 MILLIGRAM(S): 80 TABLET, FILM COATED ORAL at 21:14

## 2023-05-18 NOTE — PHYSICAL THERAPY INITIAL EVALUATION ADULT - ACTIVE RANGE OF MOTION EXAMINATION, REHAB EVAL
except LT FT; limited DF/PF 2/2 pain/bilateral upper extremity Active ROM was WFL (within functional limits)/bilateral  lower extremity Active ROM was WFL (within functional limits)

## 2023-05-18 NOTE — PHYSICAL THERAPY INITIAL EVALUATION ADULT - NAME OF DISCHARGE PLANNER
%, Z= 1.22)*   10/16/17 163 lb (73.9 kg) (91 %, Z= 1.32)*     * Growth percentiles are based on CDC 2-20 Years data. Body mass index is 23.92 kg/m². Physical Exam   Constitutional: She appears well-developed and well-nourished. No distress. HENT:   Head: Normocephalic. Mouth/Throat: Oropharynx is clear and moist. No oropharyngeal exudate. Cardiovascular: Normal rate, regular rhythm and normal heart sounds. No murmur heard. Pulmonary/Chest: Effort normal. She has decreased breath sounds in the right upper field, the right middle field, the right lower field, the left upper field and the left middle field. She has rales in the left lower field. Abdominal: Soft. She exhibits no distension. There is no tenderness. Musculoskeletal: She exhibits no edema. Skin: Skin is warm. No rash noted. Rapid flu positive for influenza B      Assessment:    1. Influenza, bronchopneumonia    - oseltamivir (TAMIFLU) 75 MG capsule; Take 1 capsule by mouth 2 times daily for 7 days  Dispense: 14 capsule; Refill: 0    2. Influenza B    - oseltamivir (TAMIFLU) 75 MG capsule; Take 1 capsule by mouth 2 times daily for 7 days  Dispense: 14 capsule; Refill: 0    3. Moderate persistent asthma with acute exacerbation  Start prednisone. She should do albuterol scheduled the next couple of days  - predniSONE (DELTASONE) 20 MG tablet; Take 3 tablets by mouth daily for 5 days  Dispense: 15 tablet; Refill: 0    4. Cough    - POCT Influenza A/B         Plan/Patient Instructions:    Patient Instructions   Influenza and Community acquired pneumonia  Start Tamiflu  Start prednisone  Continue Breo  Use your albuterol every 4 hours for the next 2 days       Return in about 3 months (around 3/19/2018) for asthma .        [facility] Lexi. 5/23- Crys Martinez

## 2023-05-18 NOTE — PHYSICAL THERAPY INITIAL EVALUATION ADULT - LEVEL OF INDEPENDENCE: GAIT, REHAB EVAL
with rolling walker progressing to independent. Ambulation with straight cane with min assist./supervision

## 2023-05-18 NOTE — PHYSICAL THERAPY INITIAL EVALUATION ADULT - ADDITIONAL COMMENTS
Patient reports he lives with his wife in a 2 level private house; 4 steps w/BL HR to enter and 6+4 steps (w/HR) to reach bedroom. Pt. reports he is independent in ADLs & IADLs w/o any AD; denies any hx of fall 2/2 LOB; reports his limiting factor is B/L foot pain LT>RT

## 2023-05-18 NOTE — PROGRESS NOTE ADULT - ASSESSMENT
Patient is a 82 year old male with a PMH of hyperlipidemia, hypertension, hypothyroidism, and gout who presented withleg swelling with erythema and bilateral foot pain. Patient recently with RLE cellulitis after cleaning his backyard and was treated with cephalexin with resolution of erythema but persistent edema. He then developed L foot pain with erythema and swelling, thought to be gout. He also had pain in both feet with difficulty ambulating and so he came to the ER. Patient was admitted for L foot gout, LE cellulitis and L intrapatellar bursitis.     LLE gout flare with possible component of cellulitis   Leukocytosis likely due to above   Recent RLE cellulitis s/p cephalexin  B/l LE edema likely due to above   - b/l heels with some cracked dry skin; RLE with edema but no erythema  - L great toe with erythema and mild erythema -unable to rule out cellulitis  - had some urinary frequency with cephalexin which resolved after stopping it  - leukocytosis on admission-resolved now, afebrile, overall improving  - continue on cefazolin 1g IV Q8h   - on colchicine for gout per Rheum and primary teams  - on discharge, can transition to cefadroxil 500mg PO BID (renally adjusted) to complete total 7d course on 5/22 and follow up with me as outpatient on 5/25  - encouraged to elevate b/l lower extremities   - monitor temps/WBC      Jeffry Allison M.D.  [facility], Division of Infectious Diseases  [phone]  After 5pm on weekdays and all day on weekends - please call [phone]

## 2023-05-18 NOTE — PHYSICAL THERAPY INITIAL EVALUATION ADULT - LEVEL OF INDEPENDENCE: SIT/STAND, REHAB EVAL
2/2 B/L foot pain LT >RT/unable to perform 2/2 B/L foot pain LT >RT. 5/23- contact guard progressing to independent/unable to perform

## 2023-05-18 NOTE — PHYSICAL THERAPY INITIAL EVALUATION ADULT - PERTINENT HX OF CURRENT PROBLEM, REHAB EVAL
82 year old male with a PMHx of hyperlipidemia, hypertension, hypothyroidism, and gout presented with leg swelling. About one week ago, pt states he was out gardening and taking care of his yard, felt he had a lot of dirt/leaves/etc stuck in his shoe in the process. Soon after, he developed swelling and erythema about the left calf area. He was treated with an outpatient course of keflex, which he took for about five days. The symptoms of the right leg improved but not completely. About 2-3 days prior to presentation, the patient felt that his left foot started to become erythematous and swollen as well. There is associated pain in the bilateral feet, to the point where it was difficult for him to ambulate comfortably. Generally speaking, the patient has no history of recurrent cellulitis or frequent gout flares. Hospital course: 5/16 X-ray foot LT side: small erosions at medial aspect of 1st MTP. 5/16 VA duplex: No DVT

## 2023-05-18 NOTE — CONSULT NOTE ADULT - SUBJECTIVE AND OBJECTIVE BOX
OCTAVIO OTTO  [id number]    HISTORY OF PRESENT ILLNESS:  82 year old male with a PMHx of hyperlipidemia, hypertension, hypothyroidism, and gout presented with leg swelling. About one week ago, pt states he was out gardening and taking care of his yard, felt he had a lot of dirt/leaves/etc stuck in his shoe in the process. Soon after, he developed swelling and erythema about the left calf area. He was treated with an outpatient course of keflex, which he took for about five days. The symptoms of the right leg improved but not completely. About 2-3 days prior to presentation, the patient felt that his left foot started to become erythematous and swollen as well. There is associated pain in the bilateral feet, to the point where it was difficult for him to ambulate comfortably. Generally speaking, the patient has no history of recurrent cellulitis or frequent gout flares.    ED interventions: cefazolin 1000 mg IV x1, colchicine 0.6mg PO x1. (23 21:51)    Rheumatology consulted for gout management. Hx as per above. Joint pain in Left big toe. Also with R knee pain from kneeling. Patient has hx of Gout which started 15 years ago. Last flare was about 8 years ago. Never on Urate Lowering therapy. Previous flares only affected R or L big toe. No hx of tophi. Denies alcohol use, endorses occasional red meat consumption. Notes that his pain is 80% less after two doses of Colchicine.      PAST MEDICAL & SURGICAL HISTORY:  Hypertension      HLD (hyperlipidemia)      Gout      Hypothyroidism      History of retinal detachment          Review of Systems:  Gen:  No fevers/chills, weight loss  HEENT: No blurry vision, no difficulty swallowing  CVS: No chest pain/palpitations  Resp: No SOB/wheezing  GI: No N/V/C/D/abdominal pain  MSK: L big toe pain 2/10; R knee pain  Skin: No new rashes  Neuro: No headaches    MEDICATIONS  (STANDING):  amLODIPine   Tablet 5 milliGRAM(s) Oral daily  atorvastatin 40 milliGRAM(s) Oral at bedtime  ceFAZolin   IVPB 1000 milliGRAM(s) IV Intermittent every 8 hours  chlorhexidine 2% Cloths 1 Application(s) Topical <User Schedule>  colchicine 0.6 milliGRAM(s) Oral two times a day  coronavirus bivalent (EUA) Booster Vaccine (PFIZER) 0.3 milliLiter(s) IntraMuscular once  dorzolamide 2% Ophthalmic Solution 1 Drop(s) Right EYE <User Schedule>  enoxaparin Injectable 40 milliGRAM(s) SubCutaneous every 24 hours  lactated ringers. 1000 milliLiter(s) (75 mL/Hr) IV Continuous <Continuous>  levothyroxine 100 MICROGram(s) Oral daily  metoprolol succinate ER 50 milliGRAM(s) Oral daily    MEDICATIONS  (PRN):  acetaminophen     Tablet .. 650 milliGRAM(s) Oral every 6 hours PRN Temp greater or equal to 38C (100.4F), Mild Pain (1 - 3)  aluminum hydroxide/magnesium hydroxide/simethicone Suspension 30 milliLiter(s) Oral every 4 hours PRN Dyspepsia  melatonin 3 milliGRAM(s) Oral at bedtime PRN Insomnia  ondansetron Injectable 4 milliGRAM(s) IV Push every 8 hours PRN Nausea and/or Vomiting      Allergies    No Known Drug Allergies  Shrimp (Hives)    Intolerances        PERTINENT MEDICATION HISTORY:    SOCIAL HISTORY: No alcohol or smoking use    FAMILY HISTORY:      Vital Signs Last 24 Hrs  T(C): 37.3 (18 May 2023 05:44), Max: 37.6 (17 May 2023 17:27)  T(F): 99.1 (18 May 2023 05:44), Max: 99.6 (17 May 2023 17:27)  HR: 78 (18 May 2023 05:44) (72 - 78)  BP: 133/67 (18 May 2023 05:44) (133/67 - 146/76)  BP(mean): --  RR: 18 (18 May 2023 05:44) (18 - 18)  SpO2: 97% (18 May 2023 05:44) (96% - 98%)    Parameters below as of 18 May 2023 05:44  Patient On (Oxygen Delivery Method): room air        Daily     Daily     Physical Exam:  General: No apparent distress  HEENT: EOMI, MMM  CVS: +S1/S2, RRR  Resp: CTA b/l  GI: Soft, NT/ND  MSK: L big toe without warmth or erythema but tender to deep palpation; Below right knee and near the area of tibial tuberosity is tender on palpation but no erythema, warmth or effusion is noted; OA changes of the hands  Neuro: AAOx3  Skin: no  rashes, no tophi    LABS:                        12.0   10.29 )-----------( 237      ( 18 May 2023 06:49 )             37.1     05-18    133<L>  |  100  |  23  ----------------------------<  124<H>  3.9   |  21<L>  |  1.29    Ca    8.6      18 May 2023 06:49  Phos  2.9     -  Mg     2.1     -18    TPro  8.6<H>  /  Alb  4.1  /  TBili  0.9  /  DBili  x   /  AST  33  /  ALT  35  /  AlkPhos  67  05-16    PT/INR - ( 16 May 2023 16:33 )   PT: 14.1 sec;   INR: 1.22 ratio         PTT - ( 16 May 2023 16:33 )  PTT:30.8 sec  Urinalysis Basic - ( 17 May 2023 16:10 )    Color: Light Yellow / Appearance: Clear / S.019 / pH: x  Gluc: x / Ketone: Negative  / Bili: Negative / Urobili: Negative   Blood: x / Protein: Trace / Nitrite: Negative   Leuk Esterase: Negative / RBC: 3 /hpf / WBC 0 /HPF   Sq Epi: x / Non Sq Epi: x / Bacteria: Negative    Uric Acid, Serum: 8.2 mg/dL (23 @ 17:46)     RADIOLOGY & ADDITIONAL STUDIES:  < from: VA Duplex Lower Ext Vein Scan, Bilat (23 @ 19:42) >  IMPRESSION:  No evidence of deep venous thrombosis in either lower extremity.      < end of copied text >  < from: Xray Foot AP + Lateral + Oblique, Left (23 @ 17:33) >  IMPRESSION:  Small erosions at the medial aspect of the first metatarsophalangeal   joint consistent with the history of gout.    --- End of Report ---      < end of copied text >

## 2023-05-18 NOTE — PHYSICAL THERAPY INITIAL EVALUATION ADULT - PLANNED THERAPY INTERVENTIONS, PT EVAL
Addended by: Erica Cohen on: 5/12/2023 11:43 AM     Modules accepted: Orders GOAL: Patient will be able to negotiate 16 steps in 2 weeks/gait training/ROM/strengthening/transfer training

## 2023-05-18 NOTE — CONSULT NOTE ADULT - ASSESSMENT
81 yo M with a PMHx of HLD, HTN and Gout admitted for Cellulitis treatment with IV abx. Also noted to have left big toe pain consistent with Gout. Responded well to Colchicine. Rheumatology consulted for Gout management.     #Gou flares/Podagra  Risk Factors: Infection, CKD,  Not on Urate Lowering therapy  Last attack 8-10 years ago  Uric Acid level 8.2  Xray finding of small erosion on 1st MTP most likely related to Gout  significantly improved on Colchicine    Plan  - would continue Colchicine 0.6 mg bid for 5 more days (creatinine clearance ok for this dosing)  - avoid NSAIDs due to age and CKD  - avoid steroids due to current Cellulitis (on abx)  - meets criteria for Urate Lowering therapy (ULT) due to erosion on Xray  - decision regarding ULT to be made outpatient during f/u with PCP or Rheumatology   - please send HLA [id number] lab test for Allopurinol hypersensitivity as patient is of  descent    To be discussed with Dr. Ramires, Attending    Nikhil Peraza MD  Rheumatology Fellow, PGY-5  Available on TEAMS           83 yo M with a PMHx of HLD, HTN and Gout admitted for Cellulitis treatment with IV abx. Also noted to have left big toe pain consistent with Gout. Responded well to Colchicine. Rheumatology consulted for Gout management.     #Gou flares/Podagra  Risk Factors: Infection, CKD,  Not on Urate Lowering therapy  Last attack 8-10 years ago  Uric Acid level 8.2  Xray finding of small erosion on 1st MTP most likely related to Gout  significantly improved on Colchicine    Plan  - recommend Colchicine 0.6 mg bid for a total course of 7 days  (creatinine clearance ok for this dosing)  - do not think his lateral foot pain is due to Gout; may consider inpatient vs. outpatient MRI if he continues to have pain on ambulation  - avoid NSAIDs due to age and CKD  - avoid steroids due to current Cellulitis (on abx)  - meets criteria for Urate Lowering therapy (ULT) due to erosion on Xray  - decision regarding ULT to be made outpatient  - f/u HLA [id number] lab test for Allopurinol hypersensitivity as patient is of  descent  - f/u with Dr. Ramires, Rheumatology outpatient     Discussed with Dr. Ramires, Attending    Nikhil Peraza MD  Rheumatology Fellow, PGY-5  Available on TEAMS

## 2023-05-19 LAB
ANION GAP SERPL CALC-SCNC: 13 MMOL/L — SIGNIFICANT CHANGE UP (ref 5–17)
BUN SERPL-MCNC: 22 MG/DL — SIGNIFICANT CHANGE UP (ref 7–23)
CALCIUM SERPL-MCNC: 9.3 MG/DL — SIGNIFICANT CHANGE UP (ref 8.4–10.5)
CHLORIDE SERPL-SCNC: 98 MMOL/L — SIGNIFICANT CHANGE UP (ref 96–108)
CO2 SERPL-SCNC: 23 MMOL/L — SIGNIFICANT CHANGE UP (ref 22–31)
CREAT SERPL-MCNC: 1.37 MG/DL — HIGH (ref 0.5–1.3)
EGFR: 52 ML/MIN/1.73M2 — LOW
GLUCOSE SERPL-MCNC: 108 MG/DL — HIGH (ref 70–99)
HCT VFR BLD CALC: 37.7 % — LOW (ref 39–50)
HGB BLD-MCNC: 12.6 G/DL — LOW (ref 13–17)
MAGNESIUM SERPL-MCNC: 2.1 MG/DL — SIGNIFICANT CHANGE UP (ref 1.6–2.6)
MCHC RBC-ENTMCNC: 30.2 PG — SIGNIFICANT CHANGE UP (ref 27–34)
MCHC RBC-ENTMCNC: 33.4 GM/DL — SIGNIFICANT CHANGE UP (ref 32–36)
MCV RBC AUTO: 90.4 FL — SIGNIFICANT CHANGE UP (ref 80–100)
NRBC # BLD: 0 /100 WBCS — SIGNIFICANT CHANGE UP (ref 0–0)
PHOSPHATE SERPL-MCNC: 3.2 MG/DL — SIGNIFICANT CHANGE UP (ref 2.5–4.5)
PLATELET # BLD AUTO: 260 K/UL — SIGNIFICANT CHANGE UP (ref 150–400)
POTASSIUM SERPL-MCNC: 4.1 MMOL/L — SIGNIFICANT CHANGE UP (ref 3.5–5.3)
POTASSIUM SERPL-SCNC: 4.1 MMOL/L — SIGNIFICANT CHANGE UP (ref 3.5–5.3)
RBC # BLD: 4.17 M/UL — LOW (ref 4.2–5.8)
RBC # FLD: 12.8 % — SIGNIFICANT CHANGE UP (ref 10.3–14.5)
SODIUM SERPL-SCNC: 134 MMOL/L — LOW (ref 135–145)
WBC # BLD: 8.04 K/UL — SIGNIFICANT CHANGE UP (ref 3.8–10.5)
WBC # FLD AUTO: 8.04 K/UL — SIGNIFICANT CHANGE UP (ref 3.8–10.5)

## 2023-05-19 PROCEDURE — 99233 SBSQ HOSP IP/OBS HIGH 50: CPT

## 2023-05-19 RX ADMIN — Medication 100 MILLIGRAM(S): at 21:08

## 2023-05-19 RX ADMIN — DORZOLAMIDE HYDROCHLORIDE 1 DROP(S): 20 SOLUTION/ DROPS OPHTHALMIC at 21:07

## 2023-05-19 RX ADMIN — ENOXAPARIN SODIUM 40 MILLIGRAM(S): 100 INJECTION SUBCUTANEOUS at 05:08

## 2023-05-19 RX ADMIN — Medication 0.6 MILLIGRAM(S): at 05:07

## 2023-05-19 RX ADMIN — ATORVASTATIN CALCIUM 40 MILLIGRAM(S): 80 TABLET, FILM COATED ORAL at 21:07

## 2023-05-19 RX ADMIN — DORZOLAMIDE HYDROCHLORIDE 1 DROP(S): 20 SOLUTION/ DROPS OPHTHALMIC at 09:17

## 2023-05-19 RX ADMIN — Medication 100 MILLIGRAM(S): at 05:08

## 2023-05-19 RX ADMIN — Medication 100 MILLIGRAM(S): at 14:00

## 2023-05-19 RX ADMIN — Medication 100 MICROGRAM(S): at 05:07

## 2023-05-19 RX ADMIN — CHLORHEXIDINE GLUCONATE 1 APPLICATION(S): 213 SOLUTION TOPICAL at 05:13

## 2023-05-19 RX ADMIN — AMLODIPINE BESYLATE 5 MILLIGRAM(S): 2.5 TABLET ORAL at 05:07

## 2023-05-19 RX ADMIN — Medication 50 MILLIGRAM(S): at 05:07

## 2023-05-19 RX ADMIN — Medication 0.6 MILLIGRAM(S): at 17:46

## 2023-05-19 NOTE — PROGRESS NOTE ADULT - ASSESSMENT
Patient is a 82 year old male with a PMH of hyperlipidemia, hypertension, hypothyroidism, and gout who presented withleg swelling with erythema and bilateral foot pain. Patient recently with RLE cellulitis after cleaning his backyard and was treated with cephalexin with resolution of erythema but persistent edema. He then developed L foot pain with erythema and swelling, thought to be gout. He also had pain in both feet with difficulty ambulating and so he came to the ER. Patient was admitted for L foot gout, LE cellulitis and L intrapatellar bursitis.     LLE gout flare with possible component of cellulitis   Leukocytosis likely due to above   Recent RLE cellulitis s/p cephalexin  B/l LE edema likely due to above   - b/l heels with some cracked dry skin; RLE with edema but no erythema  - L great toe with erythema and mild erythema -unable to rule out cellulitis  - had some urinary frequency with cephalexin which resolved after stopping it  - leukocytosis on admission-resolved now, afebrile, overall improved but still with pain  - continue on cefazolin 1g IV Q8h   - on colchicine for gout per Rheum and primary teams  - on discharge, can transition to cefadroxil 500mg PO BID (renally adjusted) to complete total 7d course on 5/22 and follow up with me as outpatient on 5/25  - encouraged to elevate b/l lower extremities   - monitor temps/WBC      Jeffry Allison M.D.  [facility], Division of Infectious Diseases  [phone]  After 5pm on weekdays and all day on weekends - please call [phone]

## 2023-05-20 LAB
ANION GAP SERPL CALC-SCNC: 12 MMOL/L — SIGNIFICANT CHANGE UP (ref 5–17)
BUN SERPL-MCNC: 21 MG/DL — SIGNIFICANT CHANGE UP (ref 7–23)
CALCIUM SERPL-MCNC: 9.3 MG/DL — SIGNIFICANT CHANGE UP (ref 8.4–10.5)
CHLORIDE SERPL-SCNC: 101 MMOL/L — SIGNIFICANT CHANGE UP (ref 96–108)
CO2 SERPL-SCNC: 23 MMOL/L — SIGNIFICANT CHANGE UP (ref 22–31)
CREAT SERPL-MCNC: 1.41 MG/DL — HIGH (ref 0.5–1.3)
EGFR: 50 ML/MIN/1.73M2 — LOW
GLUCOSE SERPL-MCNC: 108 MG/DL — HIGH (ref 70–99)
MAGNESIUM SERPL-MCNC: 2.1 MG/DL — SIGNIFICANT CHANGE UP (ref 1.6–2.6)
PHOSPHATE SERPL-MCNC: 3.5 MG/DL — SIGNIFICANT CHANGE UP (ref 2.5–4.5)
POTASSIUM SERPL-MCNC: 4.1 MMOL/L — SIGNIFICANT CHANGE UP (ref 3.5–5.3)
POTASSIUM SERPL-SCNC: 4.1 MMOL/L — SIGNIFICANT CHANGE UP (ref 3.5–5.3)
SODIUM SERPL-SCNC: 136 MMOL/L — SIGNIFICANT CHANGE UP (ref 135–145)

## 2023-05-20 PROCEDURE — 99233 SBSQ HOSP IP/OBS HIGH 50: CPT

## 2023-05-20 PROCEDURE — 70200 X-RAY EXAM OF EYE SOCKETS: CPT | Mod: 26

## 2023-05-20 RX ADMIN — Medication 0.6 MILLIGRAM(S): at 05:09

## 2023-05-20 RX ADMIN — Medication 100 MICROGRAM(S): at 05:09

## 2023-05-20 RX ADMIN — Medication 100 MILLIGRAM(S): at 13:26

## 2023-05-20 RX ADMIN — CHLORHEXIDINE GLUCONATE 1 APPLICATION(S): 213 SOLUTION TOPICAL at 05:10

## 2023-05-20 RX ADMIN — Medication 50 MILLIGRAM(S): at 05:09

## 2023-05-20 RX ADMIN — DORZOLAMIDE HYDROCHLORIDE 1 DROP(S): 20 SOLUTION/ DROPS OPHTHALMIC at 07:48

## 2023-05-20 RX ADMIN — ENOXAPARIN SODIUM 40 MILLIGRAM(S): 100 INJECTION SUBCUTANEOUS at 05:09

## 2023-05-20 RX ADMIN — DORZOLAMIDE HYDROCHLORIDE 1 DROP(S): 20 SOLUTION/ DROPS OPHTHALMIC at 21:31

## 2023-05-20 RX ADMIN — Medication 100 MILLIGRAM(S): at 21:31

## 2023-05-20 RX ADMIN — ATORVASTATIN CALCIUM 40 MILLIGRAM(S): 80 TABLET, FILM COATED ORAL at 21:31

## 2023-05-20 RX ADMIN — AMLODIPINE BESYLATE 5 MILLIGRAM(S): 2.5 TABLET ORAL at 05:09

## 2023-05-20 RX ADMIN — Medication 100 MILLIGRAM(S): at 05:11

## 2023-05-20 RX ADMIN — Medication 0.6 MILLIGRAM(S): at 17:31

## 2023-05-20 NOTE — PROGRESS NOTE ADULT - ASSESSMENT
Patient is a 82 year old male with a PMH of hyperlipidemia, hypertension, hypothyroidism, and gout who presented withleg swelling with erythema and bilateral foot pain. Patient recently with RLE cellulitis after cleaning his backyard and was treated with cephalexin with resolution of erythema but persistent edema. He then developed L foot pain with erythema and swelling, thought to be gout. He also had pain in both feet with difficulty ambulating and so he came to the ER. Patient was admitted for L foot gout, LE cellulitis and L intrapatellar bursitis.     LLE gout flare with possible component of cellulitis   Leukocytosis likely due to above - resolved   Recent RLE cellulitis s/p cephalexin  B/l LE edema likely due to above - improving   - LLE erythema resolved, edema improved; has some foot pain L>R  - had some urinary frequency with cephalexin which resolved after stopping it  - on colchicine for gout per Rheum and primary teams  - MR L foot pending to evaluate further etiology for pain   - continue on cefazolin 1g IV Q8h   - on discharge, transition to cefadroxil 500mg PO BID (renally adjusted) to complete total 7d course on 5/2   - can follow up with me as outpatient on 5/25  - encouraged to elevate b/l lower extremities   - monitor temps/WBC      Jeffry Allison M.D.  [facility], Division of Infectious Diseases  [phone]  After 5pm on weekdays and all day on weekends - please call [phone] Patient is a 82 year old male with a PMH of hyperlipidemia, hypertension, hypothyroidism, and gout who presented withleg swelling with erythema and bilateral foot pain. Patient recently with RLE cellulitis after cleaning his backyard and was treated with cephalexin with resolution of erythema but persistent edema. He then developed L foot pain with erythema and swelling, thought to be gout. He also had pain in both feet with difficulty ambulating and so he came to the ER. Patient was admitted for L foot gout, LE cellulitis and L intrapatellar bursitis.     LLE gout flare with possible component of cellulitis   Leukocytosis likely due to above - resolved   Recent RLE cellulitis s/p cephalexin  B/l LE edema likely due to above - improving   - LLE erythema resolved, edema improved; has some foot pain L>R more with weight bearing  - on colchicine for gout per Rheum and primary teams  - MR L foot pending to evaluate further etiology for pain   - continue on cefazolin 1g IV Q8h   - on discharge, transition to cefadroxil 500mg PO BID (renally adjusted) to complete total 7d course on 5/2   - can follow up with me as outpatient on 5/25  - encouraged to elevate b/l lower extremities   - monitor temps/WBC      Jeffry Allison M.D.  [facility], Division of Infectious Diseases  [phone]  After 5pm on weekdays and all day on weekends - please call [phone]

## 2023-05-21 LAB
ALBUMIN SERPL ELPH-MCNC: 3.5 G/DL — SIGNIFICANT CHANGE UP (ref 3.3–5)
ALP SERPL-CCNC: 72 U/L — SIGNIFICANT CHANGE UP (ref 40–120)
ALT FLD-CCNC: 63 U/L — HIGH (ref 10–45)
ANION GAP SERPL CALC-SCNC: 14 MMOL/L — SIGNIFICANT CHANGE UP (ref 5–17)
AST SERPL-CCNC: 62 U/L — HIGH (ref 10–40)
BASOPHILS # BLD AUTO: 0.06 K/UL — SIGNIFICANT CHANGE UP (ref 0–0.2)
BASOPHILS NFR BLD AUTO: 0.8 % — SIGNIFICANT CHANGE UP (ref 0–2)
BILIRUB SERPL-MCNC: 0.6 MG/DL — SIGNIFICANT CHANGE UP (ref 0.2–1.2)
BUN SERPL-MCNC: 25 MG/DL — HIGH (ref 7–23)
CALCIUM SERPL-MCNC: 9.4 MG/DL — SIGNIFICANT CHANGE UP (ref 8.4–10.5)
CHLORIDE SERPL-SCNC: 99 MMOL/L — SIGNIFICANT CHANGE UP (ref 96–108)
CO2 SERPL-SCNC: 22 MMOL/L — SIGNIFICANT CHANGE UP (ref 22–31)
CREAT SERPL-MCNC: 1.39 MG/DL — HIGH (ref 0.5–1.3)
EGFR: 51 ML/MIN/1.73M2 — LOW
EOSINOPHIL # BLD AUTO: 0.23 K/UL — SIGNIFICANT CHANGE UP (ref 0–0.5)
EOSINOPHIL NFR BLD AUTO: 2.9 % — SIGNIFICANT CHANGE UP (ref 0–6)
GLUCOSE SERPL-MCNC: 91 MG/DL — SIGNIFICANT CHANGE UP (ref 70–99)
HCT VFR BLD CALC: 38.7 % — LOW (ref 39–50)
HGB BLD-MCNC: 12.8 G/DL — LOW (ref 13–17)
IMM GRANULOCYTES NFR BLD AUTO: 0.8 % — SIGNIFICANT CHANGE UP (ref 0–0.9)
LYMPHOCYTES # BLD AUTO: 1.72 K/UL — SIGNIFICANT CHANGE UP (ref 1–3.3)
LYMPHOCYTES # BLD AUTO: 21.5 % — SIGNIFICANT CHANGE UP (ref 13–44)
MAGNESIUM SERPL-MCNC: 2.1 MG/DL — SIGNIFICANT CHANGE UP (ref 1.6–2.6)
MCHC RBC-ENTMCNC: 30.1 PG — SIGNIFICANT CHANGE UP (ref 27–34)
MCHC RBC-ENTMCNC: 33.1 GM/DL — SIGNIFICANT CHANGE UP (ref 32–36)
MCV RBC AUTO: 91.1 FL — SIGNIFICANT CHANGE UP (ref 80–100)
MONOCYTES # BLD AUTO: 0.84 K/UL — SIGNIFICANT CHANGE UP (ref 0–0.9)
MONOCYTES NFR BLD AUTO: 10.5 % — SIGNIFICANT CHANGE UP (ref 2–14)
NEUTROPHILS # BLD AUTO: 5.08 K/UL — SIGNIFICANT CHANGE UP (ref 1.8–7.4)
NEUTROPHILS NFR BLD AUTO: 63.5 % — SIGNIFICANT CHANGE UP (ref 43–77)
NRBC # BLD: 0 /100 WBCS — SIGNIFICANT CHANGE UP (ref 0–0)
PHOSPHATE SERPL-MCNC: 3.1 MG/DL — SIGNIFICANT CHANGE UP (ref 2.5–4.5)
PLATELET # BLD AUTO: 313 K/UL — SIGNIFICANT CHANGE UP (ref 150–400)
POTASSIUM SERPL-MCNC: 3.9 MMOL/L — SIGNIFICANT CHANGE UP (ref 3.5–5.3)
POTASSIUM SERPL-SCNC: 3.9 MMOL/L — SIGNIFICANT CHANGE UP (ref 3.5–5.3)
PROT SERPL-MCNC: 7.8 G/DL — SIGNIFICANT CHANGE UP (ref 6–8.3)
RBC # BLD: 4.25 M/UL — SIGNIFICANT CHANGE UP (ref 4.2–5.8)
RBC # FLD: 12.8 % — SIGNIFICANT CHANGE UP (ref 10.3–14.5)
SODIUM SERPL-SCNC: 135 MMOL/L — SIGNIFICANT CHANGE UP (ref 135–145)
WBC # BLD: 7.99 K/UL — SIGNIFICANT CHANGE UP (ref 3.8–10.5)
WBC # FLD AUTO: 7.99 K/UL — SIGNIFICANT CHANGE UP (ref 3.8–10.5)

## 2023-05-21 PROCEDURE — 99233 SBSQ HOSP IP/OBS HIGH 50: CPT

## 2023-05-21 RX ADMIN — CHLORHEXIDINE GLUCONATE 1 APPLICATION(S): 213 SOLUTION TOPICAL at 05:49

## 2023-05-21 RX ADMIN — Medication 50 MILLIGRAM(S): at 05:49

## 2023-05-21 RX ADMIN — Medication 0.6 MILLIGRAM(S): at 17:30

## 2023-05-21 RX ADMIN — Medication 100 MILLIGRAM(S): at 05:49

## 2023-05-21 RX ADMIN — Medication 100 MICROGRAM(S): at 05:49

## 2023-05-21 RX ADMIN — AMLODIPINE BESYLATE 5 MILLIGRAM(S): 2.5 TABLET ORAL at 05:49

## 2023-05-21 RX ADMIN — DORZOLAMIDE HYDROCHLORIDE 1 DROP(S): 20 SOLUTION/ DROPS OPHTHALMIC at 21:59

## 2023-05-21 RX ADMIN — Medication 100 MILLIGRAM(S): at 13:01

## 2023-05-21 RX ADMIN — Medication 0.6 MILLIGRAM(S): at 05:49

## 2023-05-21 RX ADMIN — Medication 100 MILLIGRAM(S): at 21:59

## 2023-05-21 RX ADMIN — ENOXAPARIN SODIUM 40 MILLIGRAM(S): 100 INJECTION SUBCUTANEOUS at 05:49

## 2023-05-21 RX ADMIN — DORZOLAMIDE HYDROCHLORIDE 1 DROP(S): 20 SOLUTION/ DROPS OPHTHALMIC at 08:21

## 2023-05-21 RX ADMIN — ATORVASTATIN CALCIUM 40 MILLIGRAM(S): 80 TABLET, FILM COATED ORAL at 21:59

## 2023-05-21 NOTE — PROGRESS NOTE ADULT - ASSESSMENT
Patient is a 82 year old male with a PMH of hyperlipidemia, hypertension, hypothyroidism, and gout who presented withleg swelling with erythema and bilateral foot pain. Patient recently with RLE cellulitis after cleaning his backyard and was treated with cephalexin with resolution of erythema but persistent edema. He then developed L foot pain with erythema and swelling, thought to be gout. He also had pain in both feet with difficulty ambulating and so he came to the ER. Patient was admitted for L foot gout, LE cellulitis and L intrapatellar bursitis.     LLE gout flare with possible component of cellulitis   Leukocytosis likely due to above - resolved   Recent RLE cellulitis s/p cephalexin  B/l LE edema likely due to above - improving   - LLE erythema resolved, edema much improved; has some foot pain L>R more with weight bearing  - on colchicine for gout per Rheum and primary teams  - MR L foot pending to evaluate further etiology for pain ?fracture   - continue on cefazolin 1g IV Q8h   - on discharge, transition to cefadroxil 500mg PO BID (renally adjusted) to complete total 7d course on 5/22   - can follow up with me as outpatient on 5/25  - encouraged to elevate b/l lower extremities       Jeffry Allison M.D.  [facility], Division of Infectious Diseases  [phone]  After 5pm on weekdays and all day on weekends - please call [phone]

## 2023-05-21 NOTE — PROGRESS NOTE ADULT - PROBLEM SELECTOR PLAN 9
- DVT ppx: lovenox subq  - GI ppx: none  - Diet: DASH  - spoke to son 5/19 - DVT ppx: lovenox subq  - GI ppx: none  - Diet: DASH  - spoke to son 5/21

## 2023-05-22 LAB
ALBUMIN SERPL ELPH-MCNC: 3.6 G/DL — SIGNIFICANT CHANGE UP (ref 3.3–5)
ALP SERPL-CCNC: 77 U/L — SIGNIFICANT CHANGE UP (ref 40–120)
ALT FLD-CCNC: 77 U/L — HIGH (ref 10–45)
ANION GAP SERPL CALC-SCNC: 16 MMOL/L — SIGNIFICANT CHANGE UP (ref 5–17)
AST SERPL-CCNC: 86 U/L — HIGH (ref 10–40)
BILIRUB SERPL-MCNC: 0.4 MG/DL — SIGNIFICANT CHANGE UP (ref 0.2–1.2)
BUN SERPL-MCNC: 23 MG/DL — SIGNIFICANT CHANGE UP (ref 7–23)
CALCIUM SERPL-MCNC: 9.7 MG/DL — SIGNIFICANT CHANGE UP (ref 8.4–10.5)
CHLORIDE SERPL-SCNC: 96 MMOL/L — SIGNIFICANT CHANGE UP (ref 96–108)
CO2 SERPL-SCNC: 26 MMOL/L — SIGNIFICANT CHANGE UP (ref 22–31)
CREAT SERPL-MCNC: 1.27 MG/DL — SIGNIFICANT CHANGE UP (ref 0.5–1.3)
EGFR: 56 ML/MIN/1.73M2 — LOW
GLUCOSE SERPL-MCNC: 160 MG/DL — HIGH (ref 70–99)
HAV IGM SER-ACNC: SIGNIFICANT CHANGE UP
HBV CORE IGM SER-ACNC: SIGNIFICANT CHANGE UP
HBV SURFACE AG SER-ACNC: SIGNIFICANT CHANGE UP
HCV AB S/CO SERPL IA: 0.14 S/CO — SIGNIFICANT CHANGE UP (ref 0–0.99)
HCV AB SERPL-IMP: SIGNIFICANT CHANGE UP
POTASSIUM SERPL-MCNC: 3.9 MMOL/L — SIGNIFICANT CHANGE UP (ref 3.5–5.3)
POTASSIUM SERPL-SCNC: 3.9 MMOL/L — SIGNIFICANT CHANGE UP (ref 3.5–5.3)
PROT SERPL-MCNC: 8.2 G/DL — SIGNIFICANT CHANGE UP (ref 6–8.3)
SODIUM SERPL-SCNC: 138 MMOL/L — SIGNIFICANT CHANGE UP (ref 135–145)

## 2023-05-22 PROCEDURE — 73720 MRI LWR EXTREMITY W/O&W/DYE: CPT | Mod: 26,LT

## 2023-05-22 PROCEDURE — 99233 SBSQ HOSP IP/OBS HIGH 50: CPT

## 2023-05-22 RX ADMIN — DORZOLAMIDE HYDROCHLORIDE 1 DROP(S): 20 SOLUTION/ DROPS OPHTHALMIC at 19:55

## 2023-05-22 RX ADMIN — Medication 100 MILLIGRAM(S): at 06:01

## 2023-05-22 RX ADMIN — CHLORHEXIDINE GLUCONATE 1 APPLICATION(S): 213 SOLUTION TOPICAL at 05:29

## 2023-05-22 RX ADMIN — ENOXAPARIN SODIUM 40 MILLIGRAM(S): 100 INJECTION SUBCUTANEOUS at 06:00

## 2023-05-22 RX ADMIN — ATORVASTATIN CALCIUM 40 MILLIGRAM(S): 80 TABLET, FILM COATED ORAL at 21:44

## 2023-05-22 RX ADMIN — AMLODIPINE BESYLATE 5 MILLIGRAM(S): 2.5 TABLET ORAL at 06:00

## 2023-05-22 RX ADMIN — Medication 0.6 MILLIGRAM(S): at 06:00

## 2023-05-22 RX ADMIN — Medication 50 MILLIGRAM(S): at 06:00

## 2023-05-22 RX ADMIN — Medication 100 MICROGRAM(S): at 06:00

## 2023-05-22 NOTE — PROGRESS NOTE ADULT - PROBLEM SELECTOR PLAN 9
- DVT ppx: lovenox subq  - GI ppx: none  - Diet: DASH    Dispo: needs PT re-eval pending MRI today, and u/s abd given rising LFts

## 2023-05-22 NOTE — PROGRESS NOTE ADULT - PROBLEM SELECTOR PLAN 1
Partially improved with outpatient keflex  - d/w ID Patient completed Ancef, d/c'ed abx   - improving overall

## 2023-05-22 NOTE — PROGRESS NOTE ADULT - ASSESSMENT
Patient is a 82 year old male with a PMH of hyperlipidemia, hypertension, hypothyroidism, and gout who presented withleg swelling with erythema and bilateral foot pain. Patient recently with RLE cellulitis after cleaning his backyard and was treated with cephalexin with resolution of erythema but persistent edema. He then developed L foot pain with erythema and swelling, thought to be gout. He also had pain in both feet with difficulty ambulating and so he came to the ER. Patient was admitted for L foot gout, LE cellulitis and L intrapatellar bursitis.     LLE gout flare with possible component of cellulitis   Leukocytosis likely due to above - resolved   Recent RLE cellulitis s/p cephalexin  B/l LE edema likely due to above - improving   - LLE erythema and b/l edema resolved, has some foot pain L>R more with weight bearin  - on colchicine for gout per Rheum and primary teams  - MR L foot pending to evaluate further etiology for pain ?fracture   - continue on cefazolin 1g IV Q8h -- complete course today 5/22   - encouraged to elevate b/l lower extremities       Jeffry Allison M.D.  [facility], Division of Infectious Diseases  [phone]  After 5pm on weekdays and all day on weekends - please call [phone]

## 2023-05-23 LAB
ALBUMIN SERPL ELPH-MCNC: 3.6 G/DL — SIGNIFICANT CHANGE UP (ref 3.3–5)
ALP SERPL-CCNC: 77 U/L — SIGNIFICANT CHANGE UP (ref 40–120)
ALT FLD-CCNC: 89 U/L — HIGH (ref 10–45)
ANION GAP SERPL CALC-SCNC: 15 MMOL/L — SIGNIFICANT CHANGE UP (ref 5–17)
AST SERPL-CCNC: 92 U/L — HIGH (ref 10–40)
BILIRUB SERPL-MCNC: 0.4 MG/DL — SIGNIFICANT CHANGE UP (ref 0.2–1.2)
BUN SERPL-MCNC: 24 MG/DL — HIGH (ref 7–23)
CALCIUM SERPL-MCNC: 9.7 MG/DL — SIGNIFICANT CHANGE UP (ref 8.4–10.5)
CHLORIDE SERPL-SCNC: 99 MMOL/L — SIGNIFICANT CHANGE UP (ref 96–108)
CO2 SERPL-SCNC: 24 MMOL/L — SIGNIFICANT CHANGE UP (ref 22–31)
CREAT SERPL-MCNC: 1.29 MG/DL — SIGNIFICANT CHANGE UP (ref 0.5–1.3)
EGFR: 55 ML/MIN/1.73M2 — LOW
GLUCOSE SERPL-MCNC: 97 MG/DL — SIGNIFICANT CHANGE UP (ref 70–99)
POTASSIUM SERPL-MCNC: 4 MMOL/L — SIGNIFICANT CHANGE UP (ref 3.5–5.3)
POTASSIUM SERPL-SCNC: 4 MMOL/L — SIGNIFICANT CHANGE UP (ref 3.5–5.3)
PROT SERPL-MCNC: 8.2 G/DL — SIGNIFICANT CHANGE UP (ref 6–8.3)
SODIUM SERPL-SCNC: 138 MMOL/L — SIGNIFICANT CHANGE UP (ref 135–145)

## 2023-05-23 PROCEDURE — 76700 US EXAM ABDOM COMPLETE: CPT | Mod: 26

## 2023-05-23 PROCEDURE — 99232 SBSQ HOSP IP/OBS MODERATE 35: CPT

## 2023-05-23 RX ADMIN — DORZOLAMIDE HYDROCHLORIDE 1 DROP(S): 20 SOLUTION/ DROPS OPHTHALMIC at 08:53

## 2023-05-23 RX ADMIN — Medication 50 MILLIGRAM(S): at 08:53

## 2023-05-23 RX ADMIN — Medication 100 MICROGRAM(S): at 05:24

## 2023-05-23 RX ADMIN — ENOXAPARIN SODIUM 40 MILLIGRAM(S): 100 INJECTION SUBCUTANEOUS at 05:25

## 2023-05-23 RX ADMIN — AMLODIPINE BESYLATE 5 MILLIGRAM(S): 2.5 TABLET ORAL at 05:24

## 2023-05-23 RX ADMIN — DORZOLAMIDE HYDROCHLORIDE 1 DROP(S): 20 SOLUTION/ DROPS OPHTHALMIC at 20:05

## 2023-05-23 NOTE — PROGRESS NOTE ADULT - PROBLEM SELECTOR PROBLEM 2
Acute gout of foot

## 2023-05-23 NOTE — DISCHARGE NOTE PROVIDER - CARE PROVIDER_API CALL
Lenin Ramires)  Internal Medicine; Rheumatology  [address] [facility], [facility] [address]  [place] [address]  Phone: [phone]  Fax: [phone]  Follow Up Time: 2 weeks   Lenin Ramires  Rheumatology  [address], [address]  [place] [address]  Phone: [phone]  Fax: [phone]  Follow Up Time: 2 weeks    Gabi Lawton  Phone: [phone]  Fax: (   )    -  Follow Up Time: 1 week

## 2023-05-23 NOTE — DISCHARGE NOTE PROVIDER - NSDCFUADDINST_GEN_ALL_CORE_FT
You had an EKG during your hospital admission that shows Q waves in leads III, AVL. No prior EKGs available to compare.  - Echo noted to have w/LVEF 70%, unable to assess diastolic dysfunction at this time, mild AS  - no acute symptoms in regards to chest pain or SOB  - Recommended to have repeat echo outpatient. Please follow up with PCP and/or cardiology

## 2023-05-23 NOTE — PROGRESS NOTE ADULT - PROBLEM SELECTOR PLAN 8
- continue home synthroid 100 mcg daily

## 2023-05-23 NOTE — DISCHARGE NOTE PROVIDER - NSDCCPCAREPLAN_GEN_ALL_CORE_FT
PRINCIPAL DISCHARGE DIAGNOSIS  Diagnosis: Cellulitis  Assessment and Plan of Treatment: Partially improved with outpatient keflex  Completed IV antibiotics        SECONDARY DISCHARGE DIAGNOSES  Diagnosis: Acute gout of foot  Assessment and Plan of Treatment: Gout flare in the setting of acute infection. Pt has no history of recurrent gout infections   Was on colchicine during admission but AR'ed for elevated LFTs.   - Acute hepatitis panel negative  Ultrasound of Abdomen performed   - f/u HLA-   - Needs outpatient uric acid for possible allopurinol initiation  - rheum consulted (holding off on steroids due to cellulitis)  - left foot also has left lateral point tenderness/pain and unable to bear weight, MRI with  Extensive soft tissue edema and enhancement involving the third digit with bony erosive changes centered at the distal interphalangeal joint.   Seen by ID for LLE gout flare with possible component of cellulitis   You had an elevated white blood cell count that has resolved and you completed antibiotics as per infectious disease  Follow up with PCP outpatient      Diagnosis: Hypertension  Assessment and Plan of Treatment: Continue to follow a low salt/sodium diet.  Perform physical activities as tolerated in consultation with your Primary Care Provider and physical therapist.  Take all medications as prescribed.  Follow up with your medical doctor for routine blood pressure monitoring at your next visit.  Notify your doctor if you have any of the following symptoms:  Dizziness, lightheadedness, blurry vision, headache, chest pain, or shortness of breath.      Diagnosis: Hypothyroidism  Assessment and Plan of Treatment: Follow up with PCP and/or endocrinology routinely for this.   Make sure you take synthroid on an empty stomach 1 hour prior to and not have any calcium, iron, or magnesium within 4 hours prior to or after taking synthroid.      Diagnosis: Hyperlipidemia  Assessment and Plan of Treatment: Low salt, low fat, low cholesterol, diabetic diet if appropriate  Continue medication as prescribed  Exercise, increase your activity level  Pt. verbalized an understanding of all instructions.      Diagnosis: Cellulitis  Assessment and Plan of Treatment: Partially improved with outpatient keflex  Completed IV antibiotics       PRINCIPAL DISCHARGE DIAGNOSIS  Diagnosis: Cellulitis  Assessment and Plan of Treatment: Partially improved with outpatient keflex  Completed IV antibiotics        SECONDARY DISCHARGE DIAGNOSES  Diagnosis: Acute gout of foot  Assessment and Plan of Treatment: Gout flare in the setting of acute infection. Pt has no history of recurrent gout infections   Was on colchicine during admission but NE'ed for elevated LFTs.   - Acute hepatitis panel negative  Ultrasound of Abdomen performed   -HLA- sent; still pending in lab on discharge day  - Needs outpatient uric acid for possible allopurinol initiation  - rheum consulted (holding off on steroids due to cellulitis)  - left foot also has left lateral point tenderness/pain and unable to bear weight, MRI with  Extensive soft tissue edema and enhancement involving the third digit with bony erosive changes centered at the distal interphalangeal joint.   Seen by ID for LLE gout flare with possible component of cellulitis   You had an elevated white blood cell count that has resolved and you completed antibiotics as per infectious disease  Follow up with PCP outpatient  Per Rheumatology, follow up outpatient as you meet criteria for Urate Lowering therapy (ULT) due to erosion on Xray      Diagnosis: Hypertension  Assessment and Plan of Treatment: Continue to follow a low salt/sodium diet.  Perform physical activities as tolerated in consultation with your Primary Care Provider and physical therapist.  Take all medications as prescribed.  Follow up with your medical doctor for routine blood pressure monitoring at your next visit.  Notify your doctor if you have any of the following symptoms:  Dizziness, lightheadedness, blurry vision, headache, chest pain, or shortness of breath.      Diagnosis: Hypothyroidism  Assessment and Plan of Treatment: Follow up with PCP and/or endocrinology routinely for this.   Make sure you take synthroid on an empty stomach 1 hour prior to and not have any calcium, iron, or magnesium within 4 hours prior to or after taking synthroid.      Diagnosis: Hyperlipidemia  Assessment and Plan of Treatment: Low salt, low fat, low cholesterol, diabetic diet if appropriate  Continue medication as prescribed  Exercise, increase your activity level  Pt. verbalized an understanding of all instructions.  You may resume your statin on discharge   Follow up with PCP      Diagnosis: Cellulitis  Assessment and Plan of Treatment: Partially improved with outpatient keflex  Completed IV antibiotics

## 2023-05-23 NOTE — PROGRESS NOTE ADULT - PROBLEM SELECTOR PLAN 2
Gout flare in the setting of acute infection. Pt has no history of recurrent gout infections   - s/p colchicine 0.6mg PO x1  - continue with colchicine 0.6 mg PO BID  - f/u HLA- ordered for tomorrow  - f/u outpatient uric acid for possible allopurinol initiation
Gout flare in the setting of acute infection. Pt has no history of recurrent gout infections   - s/p colchicine 0.6mg PO x1  - continue with colchicine 0.6 mg PO BID for total 7 days   - f/u HLA- (in lab)  - f/u outpatient uric acid for possible allopurinol initiation  - rheum recs appreciated (holding off on steroids due to cellulitis)  - left foot also has left lateral point tenderness/pain and unable to bear weight, f/u MRI to r/o fracture (scheduled for 4PM). patient and son in agreement with plan. PT re-eval pending results
Gout flare in the setting of acute infection. Pt has no history of recurrent gout infections   - s/p colchicine 0.6mg PO x1  - continue with colchicine 0.6 mg PO BID for total 7 days (to be completed 5/23)  - f/u HLA- (in lab)  - f/u outpatient uric acid for possible allopurinol initiation  - rheum recs appreciated (holding off on steroids due to cellulitis)  - left foot also has left lateral point tenderness/pain and unable to bear weight, f/u MRI to r/o fracture (called to get it expedited). patient and son in agreement with plan. PT re-eval pending results
Gout flare in the setting of acute infection. Pt has no history of recurrent gout infections   - noted to have transaminitis likely 2/2 recent Colchicine, R factor: mixed injury,   - Acute hepatitis panel: negative and u/s abd: unremarkable + cholestasias    - f/u HLA- (in lab)  - f/u outpatient uric acid for possible allopurinol initiation  - rheum recs appreciated (holding off on steroids due to cellulitis)  - PT rec home PT
Gout flare in the setting of acute infection. Pt has no history of recurrent gout infections   - s/p colchicine 0.6mg PO x1  - continue with colchicine 0.6 mg PO BID for total 7 days   - f/u HLA- (in lab)  - f/u outpatient uric acid for possible allopurinol initiation  - rheum recs appreciated (holding off on steroids due to cellulitis)  - left foot also has left lateral point tenderness/pain and unable to bear weight, will obtain MRI to r/o fracture. patient and son in agreement with plan
Gout flare in the setting of acute infection. Pt has no history of recurrent gout infections   - s/p colchicine 0.6mg PO x1  - continue with colchicine 0.6 mg PO BID  - f/u HLA- (ordered this AM, in lab)  - f/u outpatient uric acid for possible allopurinol initiation  - rheum recs appreciated (holding off on steroids due to cellulitis)
Gout flare in the setting of acute infection. Pt has no history of recurrent gout infections   - noted to have transaminitis, d/c'ed Colchicine, R factor: mixed injury   - check Acute hepatitis panel and u/s abd, monitor BMP    - f/u HLA- (in lab)  - f/u outpatient uric acid for possible allopurinol initiation  - rheum recs appreciated (holding off on steroids due to cellulitis)  - left foot also has left lateral point tenderness/pain and unable to bear weight, f/u MRI to r/o fracture (called to get it expedited). patient and son in agreement with plan. PT re-eval pending results

## 2023-05-23 NOTE — PROGRESS NOTE ADULT - PROBLEM SELECTOR PLAN 1
Partially improved with outpatient keflex  - d/w ID Patient completed Ancef, d/c'ed abx   - improving overall  - MRI foot posttraumatic/ infection or inflammatory: no fractures noted, incidental finding of liner foreign body of 2nd digit, however on exam foot is wnl, no trauma, lesion, foregin body notified, on personal review of MRI unclear if there is a foreign body in the 2nd digit. Patient does not have pain at the location of the MRI finding. Partially improved with outpatient keflex  - d/w ID Patient completed Ancef, d/c'ed abx   - improving overall  - MRI foot posttraumatic/ infection or inflammatory: no fractures noted, incidental finding of liner foreign body of 2nd digit, however on exam with nurse Estephanie Lee at bedside foot is wnl, no trauma, lesion, foregin body noted, on personal review of MRI unclear if there is a foreign body in the 2nd digit. Patient does not have pain at the location of the MRI finding.

## 2023-05-23 NOTE — DISCHARGE NOTE PROVIDER - NSDCMRMEDTOKEN_GEN_ALL_CORE_FT
amLODIPine 5 mg oral tablet: 1 tab(s) orally once a day  dorzolamide 2% ophthalmic solution: 1 in the right eye 2 times a day  levothyroxine 100 mcg (0.1 mg) oral capsule: 1 tab(s) orally once a day  simvastatin 40 mg oral tablet: 1 orally once a day  Toprol-XL 50 mg oral tablet, extended release: 1 orally once a day   amLODIPine 5 mg oral tablet: 1 tab(s) orally once a day  dorzolamide 2% ophthalmic solution: 1 in the right eye 2 times a day  levothyroxine 100 mcg (0.1 mg) oral capsule: 1 tab(s) orally once a day  Toprol-XL 50 mg oral tablet, extended release: 1 orally once a day   amLODIPine 5 mg oral tablet: 1 tab(s) orally once a day  dorzolamide 2% ophthalmic solution: 1 in the right eye 2 times a day  levothyroxine 100 mcg (0.1 mg) oral capsule: 1 tab(s) orally once a day  simvastatin 40 mg oral tablet: 1 orally once a day (at bedtime)  Toprol-XL 50 mg oral tablet, extended release: 1 orally once a day

## 2023-05-23 NOTE — DISCHARGE NOTE NURSING/CASE MANAGEMENT/SOCIAL WORK - PATIENT PORTAL LINK FT
You can access the FollowMyHealth Patient Portal offered by [facility] by registering at the following website: [email or web address]. By joining [facility]’s FollowMyHealth portal, you will also be able to view your health information using other applications (apps) compatible with our system.

## 2023-05-23 NOTE — PROGRESS NOTE ADULT - PROBLEM SELECTOR PLAN 4
likely from decreased po intake, overall improving  -improving with IVF  -strict Is/Os  -avoid nephrotoxic agents  -renally dose all medications
likely from decreased po intake, overall improving  -improving with IVF  -strict Is/Os  -avoid nephrotoxic agents  -renally dose all medications
resolved   -strict Is/Os  -avoid nephrotoxic agents  -renally dose all medications
resolved   -strict Is/Os  -avoid nephrotoxic agents  -renally dose all medications
likely from decreased po intake, overall improving  -Obtain Urine studies  -start ivf  -strict Is/Os  -avoid nephrotoxic agents  -renally dose all medications
likely from decreased po intake, overall improving  -improving with IVF  -strict Is/Os  -avoid nephrotoxic agents  -renally dose all medications
likely from decreased po intake, overall improving  -improving with IVF  -strict Is/Os  -avoid nephrotoxic agents  -renally dose all medications

## 2023-05-23 NOTE — PROGRESS NOTE ADULT - PROBLEM SELECTOR PROBLEM 5
Urinary frequency

## 2023-05-23 NOTE — PROGRESS NOTE ADULT - PROBLEM SELECTOR PROBLEM 4
CATHERINE (acute kidney injury)

## 2023-05-23 NOTE — DISCHARGE NOTE PROVIDER - NSDCFUADDAPPT_GEN_ALL_CORE_FT
APPTS ARE READY TO BE MADE: [ ] YES    Best Family or Patient Contact (if needed):    Additional Information about above appointments (if needed):    1: PCP  2: Rheumatology  3:     Other comments or requests:    APPTS ARE READY TO BE MADE: [X] YES    Best Family or Patient Contact (if needed):    Additional Information about above appointments (if needed):    1: PCP  2: Rheumatology  3:     Other comments or requests:    APPTS ARE READY TO BE MADE: [X] YES    Best Family or Patient Contact (if needed):    Additional Information about above appointments (if needed):    1: PCP  2: Rheumatology  3:     Other comments or requests:   Patient advised they currently have a Rheumatology specialist that they will follow up with.   Patient was previously scheduled with Dr. Santana.

## 2023-05-23 NOTE — PROGRESS NOTE ADULT - NSPROGADDITIONALINFOA_GEN_ALL_CORE
d/w acp    Thuy Reddy MD  [facility] Division of Hospital Medicine  Available via MS Teams  Pager: [phone]
time spent reviewing prior charts, meds, discussing plan with patient= 55 min     d/w Medicine ACP Layla
d/w acp    Thuy Reddy MD  [facility] Division of Hospital Medicine  Available via MS Teams  Pager: [phone]
d/w acp    Thuy Reddy MD  [facility] Division of Hospital Medicine  Available via MS Teams  Pager: [phone]
d/w ID    Thuy Reddy MD  [facility] Division of Hospital Medicine  Available via MS Teams  Pager: [phone]
d/w acp    Thuy Reddy MD  [facility] Division of Hospital Medicine  Available via MS Teams  Pager: [phone]
time spent reviewing prior charts, meds, discussing plan with patient= 40 min    d/w Medicine ACP nic

## 2023-05-23 NOTE — CHART NOTE - NSCHARTNOTEFT_GEN_A_CORE
Patient requires a rolling walker for d/c to home due to diagnosis of Gout.   RW RX provided.   Refer to PT notes for further details.     SKYLAR BañuelosC  [id number]

## 2023-05-23 NOTE — DISCHARGE NOTE NURSING/CASE MANAGEMENT/SOCIAL WORK - NSDCFUADDAPPT_GEN_ALL_CORE_FT
APPTS ARE READY TO BE MADE: [ ] YES    Best Family or Patient Contact (if needed):    Additional Information about above appointments (if needed):    1: PCP  2: Rheumatology  3:     Other comments or requests:

## 2023-05-23 NOTE — DISCHARGE NOTE PROVIDER - HOSPITAL COURSE
82 year old male with a PMHx of hyperlipidemia, hypertension, hypothyroidism, and gout presented with right leg cellulitis, left intrapatellar bursitiis, and acute gout left foot.  Cellulitis.   Partially improved with outpatient keflex  - d/w ID Patient completed Ancef, d/c'ed abx   - improving overall.    Acute gout of foot.   Gout flare in the setting of acute infection. Pt has no history of recurrent gout infections   - noted to have transaminitis, d/c'ed Colchicine, R factor: mixed injury   - Acute hepatitis panel negative u/s abd _____   - f/u HLA-   - f/u outpatient uric acid for possible allopurinol initiation  - rheum recs appreciated (holding off on steroids due to cellulitis)  - left foot also has left lateral point tenderness/pain and unable to bear weight, MRI with  Extensive soft tissue edema and enhancement involving the third digit with bony erosive changes centered at the distal interphalangeal joint.   Seen by ID for LLE gout flare with possible component of cellulitis   Leukocytosis likely due to above - resolved   Recent RLE cellulitis s/p cephalexin  B/l LE edema likely due to above - improving   - LLE erythema and b/l edema resolved, has some foot pain L>R more with weight bearin  - on colchicine for gout dc'ed for elevated LFTs  - completed antibiotics cefazolin     Leg edema. resolved   EKG shows Q waves in leads III, AVL. No prior EKGs available to compare.  - TTE w/LVEF 70%, unable to assess diastolic dysfunction at this time, mild AS  - no acute symptoms in regards to chest pain or SOB  - outpt repeat TTE.    CATHERINE (acute kidney injury). resolved     Urinary frequency.   Increased urinary frequency, attributed to keflex. Pt states it has improved since stopping keflex one day ago  -  u/a without UTI, improved    Hypertension. continue home amlodipine 5mg daily  - continue home metoprolol succinate 50 mg daily.    Hyperlipidemia. takes simvastatin at home; while admitted c/w atorvastatin.    Hypothyroidism. continue home synthroid 100 mcg daily.    Patient medically clear by Dr. Hu on ____ with PCP follow up, Dr. Ramires, Rheumatology outpatient for meets criteria for Urate Lowering therapy (ULT) due to erosion on Xray  - decision regarding ULT to be made outpatient    Dispo: needs PT re-eval pending MRI today, and u/s abd given rising LFts. 82 year old male with a PMHx of hyperlipidemia, hypertension, hypothyroidism, and gout presented with right leg cellulitis, left intrapatellar bursitiis, and acute gout left foot.  Cellulitis.   Partially improved with outpatient keflex  - d/w ID Patient completed Ancef, d/c'ed abx   - improving overall.    Acute gout of foot.   Gout flare in the setting of acute infection. Pt has no history of recurrent gout infections   - noted to have transaminitis, d/c'ed Colchicine, R factor: mixed injury   - Acute hepatitis panel negative u/s abd Cholelithiasis without sonographic evidence of cholecystitis.  - f/u HLA-   - f/u outpatient uric acid for possible allopurinol initiation  - rheum recs appreciated (holding off on steroids due to cellulitis)  - left foot also has left lateral point tenderness/pain and unable to bear weight, MRI with  Extensive soft tissue edema and enhancement involving the third digit with bony erosive changes centered at the distal interphalangeal joint.   Seen by ID for LLE gout flare with possible component of cellulitis   Leukocytosis likely due to above - resolved   Recent RLE cellulitis s/p cephalexin  B/l LE edema likely due to above - improving   - LLE erythema and b/l edema resolved, has some foot pain L>R more with weight bearin  - on colchicine for gout dc'ed for elevated LFTs  - completed antibiotics cefazolin     Leg edema. resolved   EKG shows Q waves in leads III, AVL. No prior EKGs available to compare.  - TTE w/LVEF 70%, unable to assess diastolic dysfunction at this time, mild AS  - no acute symptoms in regards to chest pain or SOB  - outpt repeat TTE.    CATHERINE (acute kidney injury). resolved     Urinary frequency.   Increased urinary frequency, attributed to keflex. Pt states it has improved since stopping keflex one day ago  -  u/a without UTI, improved    Hypertension. continue home amlodipine 5mg daily  - continue home metoprolol succinate 50 mg daily.    Hyperlipidemia. takes simvastatin at home; while admitted c/w atorvastatin.    Hypothyroidism. continue home synthroid 100 mcg daily.    Patient medically clear by Dr. Hu on ____ with PCP follow up, Dr. Ramires, Rheumatology outpatient for meets criteria for Urate Lowering therapy (ULT) due to erosion on Xray  - decision regarding ULT to be made outpatient     82 year old male with a PMHx of hyperlipidemia, hypertension, hypothyroidism, and gout presented with right leg cellulitis, left intrapatellar bursitiis, and acute gout left foot.  Cellulitis.   Partially improved with outpatient keflex  - d/w ID Patient completed Ancef, d/c'ed abx   - improving overall.    Acute gout of foot.   Gout flare in the setting of acute infection. Pt has no history of recurrent gout infections   - noted to have transaminitis, d/c'ed Colchicine, R factor: mixed injury   - Acute hepatitis panel negative u/s abd Cholelithiasis without sonographic evidence of cholecystitis.  - f/u HLA-   - f/u outpatient uric acid for possible allopurinol initiation  - rheum recs appreciated (holding off on steroids due to cellulitis)  - left foot also has left lateral point tenderness/pain and unable to bear weight, MRI with  Extensive soft tissue edema and enhancement involving the third digit with bony erosive changes centered at the distal interphalangeal joint.   Seen by ID for LLE gout flare with possible component of cellulitis   Leukocytosis likely due to above - resolved   Recent RLE cellulitis s/p cephalexin  B/l LE edema likely due to above - improving   - LLE erythema and b/l edema resolved, has some foot pain L>R more with weight bearin  - on colchicine for gout dc'ed for elevated LFTs  - completed antibiotics cefazolin     Leg edema. resolved   EKG shows Q waves in leads III, AVL. No prior EKGs available to compare.  - TTE w/LVEF 70%, unable to assess diastolic dysfunction at this time, mild AS  - no acute symptoms in regards to chest pain or SOB  - outpt repeat TTE.    CATHERINE (acute kidney injury). resolved     Urinary frequency.   Increased urinary frequency, attributed to keflex. Pt states it has improved since stopping keflex one day ago  -  u/a without UTI, improved    Hypertension. continue home amlodipine 5mg daily  - continue home metoprolol succinate 50 mg daily.    Hyperlipidemia. takes simvastatin at home; while admitted c/w atorvastatin.    Hypothyroidism. continue home synthroid 100 mcg daily.    Patient medically clear by Dr. Hu on 5/24/23 with PCP follow up, Dr. Ramires, Rheumatology outpatient for meets criteria for Urate Lowering therapy (ULT) due to erosion on Xray  - decision regarding ULT to be made outpatient     82 year old male with a PMHx of hyperlipidemia, hypertension, hypothyroidism, and gout presented with right leg cellulitis, left intrapatellar bursitiis, and acute gout left foot.    Patient seen and examined at bedside. States that he feels well denies any CP, SOB, abd pain and n/v. No acute events overnight     #Cellulitis.   - resolved   - d/w ID Patient completed Ancef, d/c'ed abx   - MRI foot posttraumatic/ infection or inflammatory: no fractures noted, incidental finding of liner foreign body of 2nd digit, however on exam with nurse Estephanie Lee at bedside foot is wnl, no trauma, lesion, foregin body noted, on personal review of MRI unclear if there is a foreign body in the 2nd digit. Patient does not have pain at the location of the MRI finding.  - updated son about MRI results     # Acute gout of foot.   - Gout flare in the setting of acute infection. Pt has no history of recurrent gout infections   -  transaminitis now improving: likely 2/2 recent Colchicine, R factor: mixed injury, restart home statin on d/c  - Acute hepatitis panel: negative and u/s abd: unremarkable + cholestasias    - f/u Rheum outpatient uric acid for possible allopurinol initiation  - PT rec home PT.    # Leg edema.   - resolved   EKG shows Q waves in leads III, AVL. No prior EKGs available to compare.  - TTE w/LVEF 70%, unable to assess diastolic dysfunction at this time, mild AS  - remains asymptomatic   - outpt repeat TTE.    #: CATHERINE (acute kidney injury). likely has baseline CKD stage3  - resolved   -strict Is/Os  -avoid nephrotoxic agents  -renally dose all medications.  - f/u PCP in a week for repeat CMP    Patient is medically stable for d/c home to day w/ home PT.    Patient medically cleared for d/c today PCP follow up, Dr. Ramires, Rheumatology outpatient for meets criteria for Urate Lowering therapy (ULT) due to erosion on Xray. however holding colchicine  on d/c duet o elevated LFts inpatient (now improving)   - decision regarding ULT to be made outpatient

## 2023-05-23 NOTE — PROGRESS NOTE ADULT - PROBLEM SELECTOR PLAN 7
- takes simvastatin at home; while admitted c/w atorvastatin

## 2023-05-23 NOTE — PROGRESS NOTE ADULT - PROBLEM SELECTOR PLAN 5
Increased urinary frequency, attributed to keflex. Pt states it has improved since stopping keflex one day ago  -  u/a without UTI  - improved
Increased urinary frequency, attributed to keflex. Pt states it has improved since stopping keflex one day ago  - obtain u/a to rule out UTI
Increased urinary frequency, attributed to keflex. Pt states it has improved since stopping keflex one day ago  -  u/a without UTI  - improved

## 2023-05-23 NOTE — PROGRESS NOTE ADULT - ASSESSMENT
Patient is a 82 year old male with a PMH of hyperlipidemia, hypertension, hypothyroidism, and gout who presented withleg swelling with erythema and bilateral foot pain. Patient recently with RLE cellulitis after cleaning his backyard and was treated with cephalexin with resolution of erythema but persistent edema. He then developed L foot pain with erythema and swelling, thought to be gout. He also had pain in both feet with difficulty ambulating and so he came to the ER. Patient was admitted for L foot gout, LE cellulitis and L intrapatellar bursitis.     LLE gout flare with possible component of cellulitis   Leukocytosis likely due to above - resolved   Recent RLE cellulitis s/p cephalexin  B/l LE edema likely due to above - resolved   - LLE cellulitis resolved, feels foot pain much improved   - s/p colchicine for gout per Rheum  - MR L foot with no abscess/collection or OM  - s/p cefazolin-- completed 7d course 5/22   - continue off antibiotics   - elevate b/l lower extremities     Transaminitis   - LFTs mildly elevated with slight uptren  - ?in setting of cefazolin, stopped 5/22   - also off colchicine   - acute hepatitis panel negative  - follow up abdominal U/S report      Jeffry Allison M.D.  [facility], Division of Infectious Diseases  [phone]  After 5pm on weekdays and all day on weekends - please call [phone]

## 2023-05-23 NOTE — PROGRESS NOTE ADULT - PROBLEM SELECTOR PLAN 3
Likely in the setting of infection and acute gout.   EKG shows Q waves in leads III, AVL. No prior EKGs available to compare.  - TTE w/LVEF 70%, unable to assess diastolic dysfunction at this time, mild AS  - no acute symptoms in regards to chest pain or SOB  - improving edema  - outpt repeat TTE
Likely in the setting of infection and acute gout.   EKG shows Q waves in leads III, AVL. No prior EKGs available to compare.  - TTE w/LVEF 70%, unable to assess diastolic dysfunction at this time, mild AS  - no acute symptoms in regards to chest pain or SOB  - improving edema  - outpt repeat TTE
Likely in the setting of infection and acute gout.   EKG shows Q waves in leads III, AVL. No prior EKGs available to compare.  - will obtain baseline TTE   - no acute symptoms in regards to chest pain or SOB  - improving edema
Likely in the setting of infection and acute gout.   EKG shows Q waves in leads III, AVL. No prior EKGs available to compare.  - TTE w/LVEF 70%, unable to assess diastolic dysfunction at this time, mild AS  - no acute symptoms in regards to chest pain or SOB  - improving edema  - outpt repeat TTE
Likely in the setting of infection and acute gout.   EKG shows Q waves in leads III, AVL. No prior EKGs available to compare.  - TTE w/LVEF 70%, unable to assess diastolic dysfunction at this time, mild AS  - no acute symptoms in regards to chest pain or SOB  - improving edema  - outpt repeat TTE
resolved   EKG shows Q waves in leads III, AVL. No prior EKGs available to compare.  - TTE w/LVEF 70%, unable to assess diastolic dysfunction at this time, mild AS  - no acute symptoms in regards to chest pain or SOB  - outpt repeat TTE
resolved   EKG shows Q waves in leads III, AVL. No prior EKGs available to compare.  - TTE w/LVEF 70%, unable to assess diastolic dysfunction at this time, mild AS  - no acute symptoms in regards to chest pain or SOB  - outpt repeat TTE

## 2023-05-23 NOTE — DISCHARGE NOTE PROVIDER - PROVIDER TOKENS
PROVIDER:[TOKEN:[[id number]],FOLLOWUP:[2 weeks]] PROVIDER:[TOKEN:[[id number]],FOLLOWUP:[2 weeks]],FREE:[LAST:[Jered],FIRST:[Gabi],PHONE:[phone]],FAX:[(   )    -],FOLLOWUP:[1 week]]

## 2023-05-23 NOTE — PROGRESS NOTE ADULT - PROBLEM SELECTOR PLAN 6
- continue home amlodipine 5mg daily  - continue home metoprolol succinate 50 mg daily

## 2023-05-24 VITALS
DIASTOLIC BLOOD PRESSURE: 72 MMHG | TEMPERATURE: 98 F | WEIGHT: 173.28 LBS | SYSTOLIC BLOOD PRESSURE: 142 MMHG | HEART RATE: 69 BPM | RESPIRATION RATE: 18 BRPM | OXYGEN SATURATION: 96 %

## 2023-05-24 LAB
ALBUMIN SERPL ELPH-MCNC: 3.5 G/DL — SIGNIFICANT CHANGE UP (ref 3.3–5)
ALP SERPL-CCNC: 75 U/L — SIGNIFICANT CHANGE UP (ref 40–120)
ALT FLD-CCNC: 82 U/L — HIGH (ref 10–45)
ANION GAP SERPL CALC-SCNC: 12 MMOL/L — SIGNIFICANT CHANGE UP (ref 5–17)
AST SERPL-CCNC: 69 U/L — HIGH (ref 10–40)
BILIRUB SERPL-MCNC: 0.4 MG/DL — SIGNIFICANT CHANGE UP (ref 0.2–1.2)
BUN SERPL-MCNC: 28 MG/DL — HIGH (ref 7–23)
CALCIUM SERPL-MCNC: 9.5 MG/DL — SIGNIFICANT CHANGE UP (ref 8.4–10.5)
CHLORIDE SERPL-SCNC: 99 MMOL/L — SIGNIFICANT CHANGE UP (ref 96–108)
CO2 SERPL-SCNC: 24 MMOL/L — SIGNIFICANT CHANGE UP (ref 22–31)
CREAT SERPL-MCNC: 1.31 MG/DL — HIGH (ref 0.5–1.3)
EGFR: 54 ML/MIN/1.73M2 — LOW
GLUCOSE SERPL-MCNC: 102 MG/DL — HIGH (ref 70–99)
HCT VFR BLD CALC: 42.7 % — SIGNIFICANT CHANGE UP (ref 39–50)
HGB BLD-MCNC: 13.9 G/DL — SIGNIFICANT CHANGE UP (ref 13–17)
HLA-B: SIGNIFICANT CHANGE UP
HLA-B: SIGNIFICANT CHANGE UP
MCHC RBC-ENTMCNC: 29.6 PG — SIGNIFICANT CHANGE UP (ref 27–34)
MCHC RBC-ENTMCNC: 32.6 GM/DL — SIGNIFICANT CHANGE UP (ref 32–36)
MCV RBC AUTO: 91 FL — SIGNIFICANT CHANGE UP (ref 80–100)
NRBC # BLD: 0 /100 WBCS — SIGNIFICANT CHANGE UP (ref 0–0)
PLATELET # BLD AUTO: 348 K/UL — SIGNIFICANT CHANGE UP (ref 150–400)
POTASSIUM SERPL-MCNC: 3.9 MMOL/L — SIGNIFICANT CHANGE UP (ref 3.5–5.3)
POTASSIUM SERPL-SCNC: 3.9 MMOL/L — SIGNIFICANT CHANGE UP (ref 3.5–5.3)
PROT SERPL-MCNC: 8.1 G/DL — SIGNIFICANT CHANGE UP (ref 6–8.3)
RBC # BLD: 4.69 M/UL — SIGNIFICANT CHANGE UP (ref 4.2–5.8)
RBC # FLD: 12.9 % — SIGNIFICANT CHANGE UP (ref 10.3–14.5)
REF LAB TEST METHOD: SIGNIFICANT CHANGE UP
SODIUM SERPL-SCNC: 135 MMOL/L — SIGNIFICANT CHANGE UP (ref 135–145)
WBC # BLD: 7.5 K/UL — SIGNIFICANT CHANGE UP (ref 3.8–10.5)
WBC # FLD AUTO: 7.5 K/UL — SIGNIFICANT CHANGE UP (ref 3.8–10.5)

## 2023-05-24 PROCEDURE — 85027 COMPLETE CBC AUTOMATED: CPT

## 2023-05-24 PROCEDURE — C8929: CPT

## 2023-05-24 PROCEDURE — 80053 COMPREHEN METABOLIC PANEL: CPT

## 2023-05-24 PROCEDURE — 83880 ASSAY OF NATRIURETIC PEPTIDE: CPT

## 2023-05-24 PROCEDURE — 99285 EMERGENCY DEPT VISIT HI MDM: CPT

## 2023-05-24 PROCEDURE — 84100 ASSAY OF PHOSPHORUS: CPT

## 2023-05-24 PROCEDURE — 97161 PT EVAL LOW COMPLEX 20 MIN: CPT

## 2023-05-24 PROCEDURE — 83935 ASSAY OF URINE OSMOLALITY: CPT

## 2023-05-24 PROCEDURE — 73720 MRI LWR EXTREMITY W/O&W/DYE: CPT

## 2023-05-24 PROCEDURE — 96374 THER/PROPH/DIAG INJ IV PUSH: CPT

## 2023-05-24 PROCEDURE — 73630 X-RAY EXAM OF FOOT: CPT

## 2023-05-24 PROCEDURE — 70200 X-RAY EXAM OF EYE SOCKETS: CPT

## 2023-05-24 PROCEDURE — 84156 ASSAY OF PROTEIN URINE: CPT

## 2023-05-24 PROCEDURE — 85730 THROMBOPLASTIN TIME PARTIAL: CPT

## 2023-05-24 PROCEDURE — 86140 C-REACTIVE PROTEIN: CPT

## 2023-05-24 PROCEDURE — 93970 EXTREMITY STUDY: CPT

## 2023-05-24 PROCEDURE — 85610 PROTHROMBIN TIME: CPT

## 2023-05-24 PROCEDURE — 97116 GAIT TRAINING THERAPY: CPT

## 2023-05-24 PROCEDURE — 81381 HLA I TYPING 1 ALLELE HR: CPT

## 2023-05-24 PROCEDURE — 84550 ASSAY OF BLOOD/URIC ACID: CPT

## 2023-05-24 PROCEDURE — 83735 ASSAY OF MAGNESIUM: CPT

## 2023-05-24 PROCEDURE — 84300 ASSAY OF URINE SODIUM: CPT

## 2023-05-24 PROCEDURE — 84484 ASSAY OF TROPONIN QUANT: CPT

## 2023-05-24 PROCEDURE — 80048 BASIC METABOLIC PNL TOTAL CA: CPT

## 2023-05-24 PROCEDURE — 83036 HEMOGLOBIN GLYCOSYLATED A1C: CPT

## 2023-05-24 PROCEDURE — 36415 COLL VENOUS BLD VENIPUNCTURE: CPT

## 2023-05-24 PROCEDURE — 81001 URINALYSIS AUTO W/SCOPE: CPT

## 2023-05-24 PROCEDURE — 85652 RBC SED RATE AUTOMATED: CPT

## 2023-05-24 PROCEDURE — 99239 HOSP IP/OBS DSCHRG MGMT >30: CPT

## 2023-05-24 PROCEDURE — 97110 THERAPEUTIC EXERCISES: CPT

## 2023-05-24 PROCEDURE — A9585: CPT

## 2023-05-24 PROCEDURE — 80074 ACUTE HEPATITIS PANEL: CPT

## 2023-05-24 PROCEDURE — 82570 ASSAY OF URINE CREATININE: CPT

## 2023-05-24 PROCEDURE — 76700 US EXAM ABDOM COMPLETE: CPT

## 2023-05-24 PROCEDURE — 84540 ASSAY OF URINE/UREA-N: CPT

## 2023-05-24 PROCEDURE — 97530 THERAPEUTIC ACTIVITIES: CPT

## 2023-05-24 PROCEDURE — 85025 COMPLETE CBC W/AUTO DIFF WBC: CPT

## 2023-05-24 PROCEDURE — 84133 ASSAY OF URINE POTASSIUM: CPT

## 2023-05-24 RX ORDER — SIMVASTATIN 20 MG/1
1 TABLET, FILM COATED ORAL
Qty: 0 | Refills: 0 | DISCHARGE

## 2023-05-24 RX ORDER — SIMVASTATIN 20 MG/1
1 TABLET, FILM COATED ORAL
Refills: 0 | DISCHARGE

## 2023-05-24 RX ADMIN — Medication 50 MILLIGRAM(S): at 05:28

## 2023-05-24 RX ADMIN — ENOXAPARIN SODIUM 40 MILLIGRAM(S): 100 INJECTION SUBCUTANEOUS at 05:28

## 2023-05-24 RX ADMIN — DORZOLAMIDE HYDROCHLORIDE 1 DROP(S): 20 SOLUTION/ DROPS OPHTHALMIC at 09:52

## 2023-05-24 RX ADMIN — Medication 100 MICROGRAM(S): at 05:28

## 2023-05-24 RX ADMIN — AMLODIPINE BESYLATE 5 MILLIGRAM(S): 2.5 TABLET ORAL at 05:28

## 2023-05-24 NOTE — PROGRESS NOTE ADULT - ASSESSMENT
Patient is a 82 year old male with a PMH of hyperlipidemia, hypertension, hypothyroidism, and gout who presented withleg swelling with erythema and bilateral foot pain. Patient recently with RLE cellulitis after cleaning his backyard and was treated with cephalexin with resolution of erythema but persistent edema. He then developed L foot pain with erythema and swelling, thought to be gout. He also had pain in both feet with difficulty ambulating and so he came to the ER. Patient was admitted for L foot gout, LE cellulitis and L intrapatellar bursitis.     LLE gout flare with possible component of cellulitis   Leukocytosis likely due to above - resolved   Recent RLE cellulitis s/p cephalexin  B/l LE edema likely due to above - resolved   - LLE cellulitis resolved, feels foot pain much improved   - s/p colchicine for gout per Rheum  - MR L foot with no abscess/collection or OM  - s/p cefazolin-- completed 7d course 5/22   - continue off antibiotics   - elevate b/l lower extremities     Transaminitis   - LFTs mildly elevated --downtrended today   - ?in setting of cefazolin, stopped 5/22   - also off colchicine   - acute hepatitis panel negative  - abdominal U/S with cholelithiasis without u/s e/o cholecystitis     Stable from ID standpoint at this time.     D/w patient and son, Dr. Botello, over the phone   Jeffry Allison M.D.  [facility], Division of Infectious Diseases  [phone]  After 5pm on weekdays and all day on weekends - please call [phone]

## 2023-05-24 NOTE — CHART NOTE - NSCHARTNOTEFT_GEN_A_CORE
82 year old male with a PMHx of hyperlipidemia, hypertension, hypothyroidism, and gout presented with right leg cellulitis, left intrapatellar bursitiis, and acute gout left foot.    Patient seen and examined at bedside. States that he feels well denies any CP, SOB, abd pain and n/v. No acute events overnight     #Cellulitis.   - resolved   - d/w ID Patient completed Ancef, d/c'ed abx   - MRI foot posttraumatic/ infection or inflammatory: no fractures noted, incidental finding of liner foreign body of 2nd digit, however on exam with nurse Estephanie Lee at bedside foot is wnl, no trauma, lesion, foregin body noted, on personal review of MRI unclear if there is a foreign body in the 2nd digit. Patient does not have pain at the location of the MRI finding.  - updated son about MRI results     # Acute gout of foot.   - Gout flare in the setting of acute infection. Pt has no history of recurrent gout infections   -  transaminitis now improving: likely 2/2 recent Colchicine, R factor: mixed injury, restart home statin on d/c  - Acute hepatitis panel: negative and u/s abd: unremarkable + cholestasias    - f/u Rheum outpatient uric acid for possible allopurinol initiation  - PT rec home PT.    # Leg edema.   - resolved   EKG shows Q waves in leads III, AVL. No prior EKGs available to compare.  - TTE w/LVEF 70%, unable to assess diastolic dysfunction at this time, mild AS  - remains asymptomatic   - outpt repeat TTE.    #: CATHERINE (acute kidney injury). likely has baseline CKD stage3  - resolved   -strict Is/Os  -avoid nephrotoxic agents  -renally dose all medications.  - f/u PCP in a week for repeat CMP    Patient is medically stable for d/c home to day w/ home PT. f/u with PCP and rheum outpt  d/w Medicine LILIAN Rich  Time spent 35 min

## 2023-05-24 NOTE — PROGRESS NOTE ADULT - PROVIDER SPECIALTY LIST ADULT
Infectious Disease
Hospitalist

## 2023-05-24 NOTE — CHART NOTE - NSCHARTNOTEFT_GEN_A_CORE
Patient medically cleared as per Dr. Hu.   D/C meds d/w Dr. Hu- may resume statin on dc, no colchicine.   D/C paperwork completed.   Hemodynamically stable for discharge this afternoon.

## 2023-05-24 NOTE — PROGRESS NOTE ADULT - SUBJECTIVE AND OBJECTIVE BOX
[facility] [place] [facility]  Thuy Reddy MD  Available via MS Teams  Pager: [phone]    SUBJECTIVE / OVERNIGHT EVENTS:  - no acute events overnight. still has left lateral foot pain, especially any palpation, and is unable to bear weight/walk on it. Denies any fevers, chills, nausea, vomiting, chest pain, shortness of breath. erythema on his extremities have resolved.     MEDICATIONS  (STANDING):  amLODIPine   Tablet 5 milliGRAM(s) Oral daily  atorvastatin 40 milliGRAM(s) Oral at bedtime  ceFAZolin   IVPB 1000 milliGRAM(s) IV Intermittent every 8 hours  chlorhexidine 2% Cloths 1 Application(s) Topical <User Schedule>  colchicine 0.6 milliGRAM(s) Oral two times a day  coronavirus bivalent (EUA) Booster Vaccine (PFIZER) 0.3 milliLiter(s) IntraMuscular once  dorzolamide 2% Ophthalmic Solution 1 Drop(s) Right EYE <User Schedule>  enoxaparin Injectable 40 milliGRAM(s) SubCutaneous every 24 hours  lactated ringers. 1000 milliLiter(s) (75 mL/Hr) IV Continuous <Continuous>  levothyroxine 100 MICROGram(s) Oral daily  metoprolol succinate ER 50 milliGRAM(s) Oral daily    MEDICATIONS  (PRN):  acetaminophen     Tablet .. 975 milliGRAM(s) Oral every 6 hours PRN Temp greater or equal to 38C (100.4F), Mild Pain (1 - 3)  aluminum hydroxide/magnesium hydroxide/simethicone Suspension 30 milliLiter(s) Oral every 4 hours PRN Dyspepsia  melatonin 3 milliGRAM(s) Oral at bedtime PRN Insomnia  ondansetron Injectable 4 milliGRAM(s) IV Push every 8 hours PRN Nausea and/or Vomiting      I&O's Summary    18 May 2023 07:  -  19 May 2023 07:00  --------------------------------------------------------  IN: 475 mL / OUT: 2400 mL / NET: -1925 mL    19 May 2023 07:01  -  19 May 2023 11:31  --------------------------------------------------------  IN: 240 mL / OUT: 625 mL / NET: -385 mL        PHYSICAL EXAM:  Vital Signs Last 24 Hrs  T(C): 36.7 (19 May 2023 04:11), Max: 37.3 (18 May 2023 19:55)  T(F): 98 (19 May 2023 04:11), Max: 99.2 (18 May 2023 19:55)  HR: 65 (19 May 2023 04:11) (65 - 70)  BP: 132/71 (19 May 2023 04:11) (131/64 - 132/71)  BP(mean): --  RR: 18 (19 May 2023 04:11) (18 - 18)  SpO2: 98% (19 May 2023 04:11) (97% - 98%)    Parameters below as of 19 May 2023 04:11  Patient On (Oxygen Delivery Method): room air      CONSTITUTIONAL: NAD, well-developed, well-groomed  EYES: PERRLA; conjunctiva and sclera clear  ENMT: Moist oral mucosa, no pharyngeal injection or exudates;   NECK: Supple, no palpable masses;   RESPIRATORY: Normal respiratory effort; lungs are clear to auscultation bilaterally  CARDIOVASCULAR: Regular rate and rhythm, normal S1 and S2, no murmur/rub/gallop; No lower extremity edema; Peripheral pulses are 2+ bilaterally  ABDOMEN: Nontender to palpation, normoactive bowel sounds, no rebound/guarding;   MUSCULOSKELETAL:  Normal gait; no clubbing or cyanosis of digits; no joint swelling or tenderness to palpation  PSYCH: A+O to person, place, and time; affect appropriate  NEUROLOGY: CN 2-12 are intact and symmetric; no gross sensory deficits   SKIN: +erythema about the left great toe and across the dorsal service of the left foot. + lateral foot tenderness/pain  LABS:                        12.6   8.04  )-----------( 260      ( 19 May 2023 07:02 )             37.7     05-19    134<L>  |  98  |  22  ----------------------------<  108<H>  4.1   |  23  |  1.37<H>    Ca    9.3      19 May 2023 07:01  Phos  3.2     05-19  Mg     2.1     05-19            Urinalysis Basic - ( 17 May 2023 16:10 )    Color: Light Yellow / Appearance: Clear / S.019 / pH: x  Gluc: x / Ketone: Negative  / Bili: Negative / Urobili: Negative   Blood: x / Protein: Trace / Nitrite: Negative   Leuk Esterase: Negative / RBC: 3 /hpf / WBC 0 /HPF   Sq Epi: x / Non Sq Epi: x / Bacteria: Negative            RADIOLOGY & ADDITIONAL TESTS:  New Results Reviewed Today: cbc cmp  New Imaging Personally Reviewed Today: n/a  New Electrocardiogram Personally Reviewed Today: n/a   Prior or Outpatient Records Reviewed Today: n/a     COMMUNICATION:  Care Discussed with Consultants/Other Providers and Details of Discussion: n/a  Discussions with Patient/Family: douglas Botello  PCP Communication n/a    
[facility]  TRAE Vargas Y. Patel, S. Shah, G. Casimir  [phone]  ([phone] - weekdays after 5pm and weekends)    Name: OCTAVIO OTTO  Age/Gender: 82y Male  MRN: [id number]    Interval History:  Patient seen and examined this morning.   Feels well, worked with PT yesterday.   No new complaints noted.  Notes reviewed  No concerning overnight events  Afebrile   Allergies: No Known Drug Allergies  Shrimp (Hives)      Objective:  Vitals:   T(F): 97.7 (05-24-23 @ 04:21), Max: 97.7 (05-23-23 @ 19:10)  HR: 62 (05-24-23 @ 04:21) (62 - 71)  BP: 139/72 (05-24-23 @ 04:21) (137/74 - 139/72)  RR: 18 (05-24-23 @ 04:21) (18 - 18)  SpO2: 98% (05-24-23 @ 04:21) (98% - 98%)  Physical Examination:  General: no acute distress  HEENT: NC/AT, anicteric, neck supple  Respiratory: no acc muscle use, breathing comfortably  Cardiovascular: S1 and S2 present  Gastrointestinal: normal appearing, nondistended  Extremities: no edema, no cyanosis  Skin: no visible rash    Laboratory Studies:  CBC:                       13.9   7.50  )-----------( 348      ( 24 May 2023 06:54 )             42.7     WBC Trend:  7.50 05-24-23 @ 06:54  7.99 05-21-23 @ 07:25  8.04 05-19-23 @ 07:02  10.29 05-18-23 @ 06:49    CMP: 05-24    135  |  99  |  28<H>  ----------------------------<  102<H>  3.9   |  24  |  1.31<H>    Ca    9.5      24 May 2023 06:54    TPro  8.1  /  Alb  3.5  /  TBili  0.4  /  DBili  x   /  AST  69<H>  /  ALT  82<H>  /  AlkPhos  75  05-24    Creatinine, Serum: 1.31 mg/dL (05-24-23 @ 06:54)  Creatinine, Serum: 1.29 mg/dL (05-23-23 @ 07:15)  Creatinine, Serum: 1.27 mg/dL (05-22-23 @ 11:55)  Creatinine, Serum: 1.39 mg/dL (05-21-23 @ 07:22)  Creatinine, Serum: 1.41 mg/dL (05-20-23 @ 07:08)  Creatinine, Serum: 1.37 mg/dL (05-19-23 @ 07:01)  Creatinine, Serum: 1.29 mg/dL (05-18-23 @ 06:49)    LIVER FUNCTIONS - ( 24 May 2023 06:54 )  Alb: 3.5 g/dL / Pro: 8.1 g/dL / ALK PHOS: 75 U/L / ALT: 82 U/L / AST: 69 U/L / GGT: x           Microbiology: reviewed   Radiology: reviewed     Medications:  acetaminophen     Tablet .. 975 milliGRAM(s) Oral every 6 hours PRN  aluminum hydroxide/magnesium hydroxide/simethicone Suspension 30 milliLiter(s) Oral every 4 hours PRN  amLODIPine   Tablet 5 milliGRAM(s) Oral daily  chlorhexidine 2% Cloths 1 Application(s) Topical <User Schedule>  coronavirus bivalent (EUA) Booster Vaccine (PFIZER) 0.3 milliLiter(s) IntraMuscular once  dorzolamide 2% Ophthalmic Solution 1 Drop(s) Right EYE <User Schedule>  enoxaparin Injectable 40 milliGRAM(s) SubCutaneous every 24 hours  levothyroxine 100 MICROGram(s) Oral daily  melatonin 3 milliGRAM(s) Oral at bedtime PRN  metoprolol succinate ER 50 milliGRAM(s) Oral daily  ondansetron Injectable 4 milliGRAM(s) IV Push every 8 hours PRN    Prior/Completed Antimicrobials:  ceFAZolin   IVPB  
[facility]  TRAE Vargas Y. Patel, S. Shah, G. Jem  [phone]  ([phone] - weekdays after 5pm and weekends)    Name: OCTAVIO OTTO  Age/Gender: 82y Male  MRN: [id number]    Interval History:  Patient seen and examined this morning.   Continues to have foot pain L>R, otherwise feels better  No new complaints noted.  Notes reviewed  No concerning overnight events  Afebrile     Allergies: No Known Drug Allergies  Shrimp (Hives)      Objective:  Vitals:   T(F): 98 (23 @ 04:11), Max: 99.2 (23 @ 19:55)  HR: 65 (23 @ 04:11) (65 - 70)  BP: 132/71 (23 @ 04:11) (131/64 - 132/71)  RR: 18 (23 @ 04:11) (18 - 18)  SpO2: 98% (23 @ 04:11) (97% - 98%)  Physical Examination:  General: no acute distress  HEENT: NC/AT, anicteric, neck supple  Respiratory: no acc muscle use, breathing comfortably  Cardiovascular: S1 and S2 present  Gastrointestinal: normal appearing, nondistended  Extremities: dec LE edema, no cyanosis  Skin: erythema on b/l LE resolved    Laboratory Studies:  CBC:                       12.6   8.04  )-----------( 260      ( 19 May 2023 07:02 )             37.7     WBC Trend:  8.04 23 @ 07:02  10.29 23 @ 06:49  10.97 23 @ 07:21  13.26 23 @ 16:33    CMP:     134<L>  |  98  |  22  ----------------------------<  108<H>  4.1   |  23  |  1.37<H>    Ca    9.3      19 May 2023 07:01  Phos  3.2       Mg     2.1         Creatinine, Serum: 1.37 mg/dL (23 @ 07:01)  Creatinine, Serum: 1.29 mg/dL (23 @ 06:49)  Creatinine, Serum: 1.45 mg/dL (23 @ 07:21)  Creatinine, Serum: 1.70 mg/dL (23 @ 16:33)    Urinalysis Basic - ( 17 May 2023 16:10 )  Color: Light Yellow / Appearance: Clear / S.019 / pH: x  Gluc: x / Ketone: Negative  / Bili: Negative / Urobili: Negative   Blood: x / Protein: Trace / Nitrite: Negative   Leuk Esterase: Negative / RBC: 3 /hpf / WBC 0 /HPF   Sq Epi: x / Non Sq Epi: x / Bacteria: Negative    Microbiology: reviewed   Radiology: reviewed     Medications:  acetaminophen     Tablet .. 975 milliGRAM(s) Oral every 6 hours PRN  aluminum hydroxide/magnesium hydroxide/simethicone Suspension 30 milliLiter(s) Oral every 4 hours PRN  amLODIPine   Tablet 5 milliGRAM(s) Oral daily  atorvastatin 40 milliGRAM(s) Oral at bedtime  ceFAZolin   IVPB 1000 milliGRAM(s) IV Intermittent every 8 hours  chlorhexidine 2% Cloths 1 Application(s) Topical <User Schedule>  colchicine 0.6 milliGRAM(s) Oral two times a day  coronavirus bivalent (EUA) Booster Vaccine (PFIZER) 0.3 milliLiter(s) IntraMuscular once  dorzolamide 2% Ophthalmic Solution 1 Drop(s) Right EYE <User Schedule>  enoxaparin Injectable 40 milliGRAM(s) SubCutaneous every 24 hours  lactated ringers. 1000 milliLiter(s) IV Continuous <Continuous>  levothyroxine 100 MICROGram(s) Oral daily  melatonin 3 milliGRAM(s) Oral at bedtime PRN  metoprolol succinate ER 50 milliGRAM(s) Oral daily  ondansetron Injectable 4 milliGRAM(s) IV Push every 8 hours PRN    Current Antimicrobials:  ceFAZolin   IVPB 1000 milliGRAM(s) IV Intermittent every 8 hours    Prior/Completed Antimicrobials:  ceFAZolin   IVPB  
[facility] Division of Hospital Medicine  Thuy Reddy MD  Available via MS Teams  Pager: [phone]    SUBJECTIVE / OVERNIGHT EVENTS:  - no events overnight, states that his lower extremity redness is improving on the right side, denies any nausea, vomiting, headaches, shortness of breath, chest pain, cough.     MEDICATIONS  (STANDING):  amLODIPine   Tablet 5 milliGRAM(s) Oral daily  atorvastatin 40 milliGRAM(s) Oral at bedtime  ceFAZolin   IVPB 1000 milliGRAM(s) IV Intermittent every 8 hours  chlorhexidine 2% Cloths 1 Application(s) Topical <User Schedule>  colchicine 0.6 milliGRAM(s) Oral two times a day  dorzolamide 2% Ophthalmic Solution 1 Drop(s) Right EYE <User Schedule>  enoxaparin Injectable 40 milliGRAM(s) SubCutaneous every 24 hours  lactated ringers. 1000 milliLiter(s) (75 mL/Hr) IV Continuous <Continuous>  levothyroxine 100 MICROGram(s) Oral daily  metoprolol succinate ER 50 milliGRAM(s) Oral daily    MEDICATIONS  (PRN):  acetaminophen     Tablet .. 650 milliGRAM(s) Oral every 6 hours PRN Temp greater or equal to 38C (100.4F), Mild Pain (1 - 3)  aluminum hydroxide/magnesium hydroxide/simethicone Suspension 30 milliLiter(s) Oral every 4 hours PRN Dyspepsia  melatonin 3 milliGRAM(s) Oral at bedtime PRN Insomnia  ondansetron Injectable 4 milliGRAM(s) IV Push every 8 hours PRN Nausea and/or Vomiting      I&O's Summary    16 May 2023 07:01  -  17 May 2023 07:00  --------------------------------------------------------  IN: 0 mL / OUT: 200 mL / NET: -200 mL    17 May 2023 07:01  -  17 May 2023 14:03  --------------------------------------------------------  IN: 0 mL / OUT: 300 mL / NET: -300 mL        PHYSICAL EXAM:  Vital Signs Last 24 Hrs  T(C): 37.2 (17 May 2023 11:59), Max: 37.7 (16 May 2023 14:52)  T(F): 99 (17 May 2023 11:59), Max: 99.9 (16 May 2023 19:45)  HR: 69 (17 May 2023 11:59) (69 - 89)  BP: 116/61 (17 May 2023 11:59) (116/61 - 132/75)  BP(mean): --  RR: 18 (17 May 2023 11:59) (18 - 20)  SpO2: 98% (17 May 2023 11:59) (94% - 99%)    Parameters below as of 17 May 2023 11:59  Patient On (Oxygen Delivery Method): room air      CONSTITUTIONAL: NAD, well-developed, well-groomed  EYES: PERRLA; conjunctiva and sclera clear  ENMT: Moist oral mucosa, no pharyngeal injection or exudates;   NECK: Supple, no palpable masses;   RESPIRATORY: Normal respiratory effort; lungs are clear to auscultation bilaterally  CARDIOVASCULAR: Regular rate and rhythm, normal S1 and S2, no murmur/rub/gallop; No lower extremity edema; Peripheral pulses are 2+ bilaterally  ABDOMEN: Nontender to palpation, normoactive bowel sounds, no rebound/guarding;   MUSCULOSKELETAL:  Normal gait; no clubbing or cyanosis of digits; no joint swelling or tenderness to palpation  PSYCH: A+O to person, place, and time; affect appropriate  NEUROLOGY: CN 2-12 are intact and symmetric; no gross sensory deficits   SKIN: +erythema about the left great toe and across the dorsal service of the left foot. there is tenderness about the right toe to palpation.     LABS:                        12.3   10.97 )-----------( 225      ( 17 May 2023 07:21 )             36.3     05-17    134<L>  |  99  |  29<H>  ----------------------------<  97  3.9   |  21<L>  |  1.45<H>    Ca    9.0      17 May 2023 07:21  Phos  3.0     05-17  Mg     2.3     05-17    TPro  8.6<H>  /  Alb  4.1  /  TBili  0.9  /  DBili  x   /  AST  33  /  ALT  35  /  AlkPhos  67  05-16    PT/INR - ( 16 May 2023 16:33 )   PT: 14.1 sec;   INR: 1.22 ratio         PTT - ( 16 May 2023 16:33 )  PTT:30.8 sec              RADIOLOGY & ADDITIONAL TESTS:  New Results Reviewed Today: cbc cmp  New Imaging Personally Reviewed Today: n/a  New Electrocardiogram Personally Reviewed Today: n/a  Prior or Outpatient Records Reviewed Today: n/a    COMMUNICATION:   Care Discussed with Consultants/Other Providers and Details of Discussion: ID   Discussions with Patient/Family: son - Dr. Shoaib Botello  PCP Communication: n/a     
[facility]  TRAE Vargas Y. Patel, S. Shah, G. Jem  [phone]  ([phone] - weekdays after 5pm and weekends)    Name: OCTAVIO OTTO  Age/Gender: 82y Male  MRN: [id number]    Interval History:  Patient seen and examined this morning.   Pain improving, still some on L forefoot.   Hopes to go for MRI L foot today   Notes reviewed  No concerning overnight events  Afebrile   Allergies: No Known Drug Allergies  Shrimp (Hives)      Objective:  Vitals:   T(F): 98.5 (05-21-23 @ 04:58), Max: 98.9 (05-20-23 @ 21:28)  HR: 64 (05-21-23 @ 04:58) (64 - 71)  BP: 122/64 (05-21-23 @ 04:58) (122/64 - 133/71)  RR: 18 (05-21-23 @ 04:58) (16 - 18)  SpO2: 97% (05-21-23 @ 04:58) (96% - 97%)  Physical Examination:  General: no acute distress  HEENT: NC/AT, anicteric, neck supple  Respiratory: no acc muscle use, breathing comfortably  Cardiovascular: S1 and S2 present  Gastrointestinal: normal appearing, nondistended  Extremities: trace LE edema, no cyanosis  Skin: no visible rash, erythema resolved     Laboratory Studies:  CBC:   WBC Trend:  8.04 05-19-23 @ 07:02  10.29 05-18-23 @ 06:49  10.97 05-17-23 @ 07:21  13.26 05-16-23 @ 16:33    CMP: 05-20    136  |  101  |  21  ----------------------------<  108<H>  4.1   |  23  |  1.41<H>    Ca    9.3      20 May 2023 07:08  Phos  3.5     05-20  Mg     2.1     05-20      Creatinine, Serum: 1.41 mg/dL (05-20-23 @ 07:08)  Creatinine, Serum: 1.37 mg/dL (05-19-23 @ 07:01)  Creatinine, Serum: 1.29 mg/dL (05-18-23 @ 06:49)  Creatinine, Serum: 1.45 mg/dL (05-17-23 @ 07:21)  Creatinine, Serum: 1.70 mg/dL (05-16-23 @ 16:33)    Microbiology: reviewed   Radiology: reviewed     Medications:  acetaminophen     Tablet .. 975 milliGRAM(s) Oral every 6 hours PRN  aluminum hydroxide/magnesium hydroxide/simethicone Suspension 30 milliLiter(s) Oral every 4 hours PRN  amLODIPine   Tablet 5 milliGRAM(s) Oral daily  atorvastatin 40 milliGRAM(s) Oral at bedtime  ceFAZolin   IVPB 1000 milliGRAM(s) IV Intermittent every 8 hours  chlorhexidine 2% Cloths 1 Application(s) Topical <User Schedule>  colchicine 0.6 milliGRAM(s) Oral two times a day  coronavirus bivalent (EUA) Booster Vaccine (PFIZER) 0.3 milliLiter(s) IntraMuscular once  dorzolamide 2% Ophthalmic Solution 1 Drop(s) Right EYE <User Schedule>  enoxaparin Injectable 40 milliGRAM(s) SubCutaneous every 24 hours  lactated ringers. 1000 milliLiter(s) IV Continuous <Continuous>  levothyroxine 100 MICROGram(s) Oral daily  melatonin 3 milliGRAM(s) Oral at bedtime PRN  metoprolol succinate ER 50 milliGRAM(s) Oral daily  ondansetron Injectable 4 milliGRAM(s) IV Push every 8 hours PRN    Current Antimicrobials:  ceFAZolin   IVPB 1000 milliGRAM(s) IV Intermittent every 8 hours    Prior/Completed Antimicrobials:  ceFAZolin   IVPB  
[facility]  TRAE Vargas Y. Patel, S. Shah, G. Jem  [phone]  ([phone] - weekdays after 5pm and weekends)    Name: OCTAVIO OTTO  Age/Gender: 82y Male  MRN: [id number]    Interval History:  Patient seen and examined this morning.   Pain less, states able to press foot against foot board  Has not tried to stand up yet and put weight  No new complaints noted.  Notes reviewed. Afebrile   Allergies: No Known Drug Allergies  Shrimp (Hives)      Objective:  Vitals:   T(F): 97.8 (05-22-23 @ 09:10), Max: 98.9 (05-21-23 @ 13:09)  HR: 61 (05-22-23 @ 09:10) (58 - 68)  BP: 137/72 (05-22-23 @ 09:10) (126/67 - 149/74)  RR: 18 (05-22-23 @ 09:10) (17 - 18)  SpO2: 98% (05-22-23 @ 09:10) (96% - 98%)  Physical Examination:  General: no acute distress, nontoxic appearing   HEENT: NC/AT, anicteric, neck supple  Respiratory: no acc muscle use, breathing comfortably  Cardiovascular: S1 and S2 present  Gastrointestinal: normal appearing, nondistended  Extremities: no LE edema, no cyanosis  Skin: no visible rash    Laboratory Studies:  CBC:                       12.8   7.99  )-----------( 313      ( 21 May 2023 07:25 )             38.7     WBC Trend:  7.99 05-21-23 @ 07:25  8.04 05-19-23 @ 07:02  10.29 05-18-23 @ 06:49  10.97 05-17-23 @ 07:21  13.26 05-16-23 @ 16:33    CMP: 05-21    135  |  99  |  25<H>  ----------------------------<  91  3.9   |  22  |  1.39<H>    Ca    9.4      21 May 2023 07:22  Phos  3.1     05-21  Mg     2.1     05-21    TPro  7.8  /  Alb  3.5  /  TBili  0.6  /  DBili  x   /  AST  62<H>  /  ALT  63<H>  /  AlkPhos  72  05-21    Creatinine, Serum: 1.39 mg/dL (05-21-23 @ 07:22)  Creatinine, Serum: 1.41 mg/dL (05-20-23 @ 07:08)  Creatinine, Serum: 1.37 mg/dL (05-19-23 @ 07:01)  Creatinine, Serum: 1.29 mg/dL (05-18-23 @ 06:49)  Creatinine, Serum: 1.45 mg/dL (05-17-23 @ 07:21)  Creatinine, Serum: 1.70 mg/dL (05-16-23 @ 16:33)    LIVER FUNCTIONS - ( 21 May 2023 07:22 )  Alb: 3.5 g/dL / Pro: 7.8 g/dL / ALK PHOS: 72 U/L / ALT: 63 U/L / AST: 62 U/L / GGT: x           Microbiology: reviewed   Radiology: reviewed     Medications:  acetaminophen     Tablet .. 975 milliGRAM(s) Oral every 6 hours PRN  aluminum hydroxide/magnesium hydroxide/simethicone Suspension 30 milliLiter(s) Oral every 4 hours PRN  amLODIPine   Tablet 5 milliGRAM(s) Oral daily  atorvastatin 40 milliGRAM(s) Oral at bedtime  ceFAZolin   IVPB 1000 milliGRAM(s) IV Intermittent every 8 hours  chlorhexidine 2% Cloths 1 Application(s) Topical <User Schedule>  colchicine 0.6 milliGRAM(s) Oral two times a day  coronavirus bivalent (EUA) Booster Vaccine (PFIZER) 0.3 milliLiter(s) IntraMuscular once  dorzolamide 2% Ophthalmic Solution 1 Drop(s) Right EYE <User Schedule>  enoxaparin Injectable 40 milliGRAM(s) SubCutaneous every 24 hours  levothyroxine 100 MICROGram(s) Oral daily  melatonin 3 milliGRAM(s) Oral at bedtime PRN  metoprolol succinate ER 50 milliGRAM(s) Oral daily  ondansetron Injectable 4 milliGRAM(s) IV Push every 8 hours PRN    Current Antimicrobials:  ceFAZolin   IVPB 1000 milliGRAM(s) IV Intermittent every 8 hours    Prior/Completed Antimicrobials:  ceFAZolin   IVPB  
[facility]  TRAE Vargas Y. Patel, S. Shah, G. Jem  [phone]  ([phone] - weekdays after 5pm and weekends)    Name: OCTAVIO OTTO  Age/Gender: 82y Male  MRN: [id number]    Interval History:  Patient seen and examined this morning.   Feels L foot pain has much improved.  Denies fever, chills, abd pain, n/v/d  No new complaints noted.  Notes reviewed, had abd U/S this am.   Afebrile   Allergies: No Known Drug Allergies  Shrimp (Hives)      Objective:  Vitals:   T(F): 98 (05-23-23 @ 08:56), Max: 98 (05-23-23 @ 08:56)  HR: 67 (05-23-23 @ 08:56) (60 - 71)  BP: 145/76 (05-23-23 @ 08:56) (129/69 - 161/77)  RR: 18 (05-23-23 @ 08:56) (18 - 18)  SpO2: 98% (05-23-23 @ 08:56) (98% - 98%)  Physical Examination:  General: no acute distress, nontoxic  HEENT: NC/AT, anicteric, neck supple  Respiratory: no acc muscle use, breathing comfortably  Cardiovascular: S1 and S2 present  Gastrointestinal: normal appearing, nondistended  Neuro: AAOx3, no obvious focal deficits   Extremities: no edema, no cyanosis  Skin: no visible rash    Laboratory Studies:  CBC:   WBC Trend:  7.99 05-21-23 @ 07:25  8.04 05-19-23 @ 07:02  10.29 05-18-23 @ 06:49  10.97 05-17-23 @ 07:21  13.26 05-16-23 @ 16:33    CMP: 05-23    138  |  99  |  24<H>  ----------------------------<  97  4.0   |  24  |  1.29    Ca    9.7      23 May 2023 07:15    TPro  8.2  /  Alb  3.6  /  TBili  0.4  /  DBili  x   /  AST  92<H>  /  ALT  89<H>  /  AlkPhos  77  05-23    Creatinine, Serum: 1.29 mg/dL (05-23-23 @ 07:15)  Creatinine, Serum: 1.27 mg/dL (05-22-23 @ 11:55)  Creatinine, Serum: 1.39 mg/dL (05-21-23 @ 07:22)  Creatinine, Serum: 1.41 mg/dL (05-20-23 @ 07:08)  Creatinine, Serum: 1.37 mg/dL (05-19-23 @ 07:01)  Creatinine, Serum: 1.29 mg/dL (05-18-23 @ 06:49)  Creatinine, Serum: 1.45 mg/dL (05-17-23 @ 07:21)  Creatinine, Serum: 1.70 mg/dL (05-16-23 @ 16:33)    LIVER FUNCTIONS - ( 23 May 2023 07:15 )  Alb: 3.6 g/dL / Pro: 8.2 g/dL / ALK PHOS: 77 U/L / ALT: 89 U/L / AST: 92 U/L / GGT: x           Acute Hepatitis Panel (05.22.23 @ 14:28)    Hepatitis C Virus Interpretation: Nonreact: Hepatitis C AB  S/CO Ratio                        Interpretation  < 1.00                                   Non-Reactive  1.00 - 4.99                         Weakly-Reactive  >= 5.00                                Reactive  Non-Reactive: A person witha non-reactive HCV antibody result is  considered uninfected.  No further action is needed unless recent  infection is suspected.  In these cases, consider repeat testing later to  detect seroconversion..  Weakly-Reactive: HCV antibody test is abnormal, HCV RNA Qualitative test  will follow.  Reactive: HCV antibody test is abnormal, HCV RNA Qualitative test will  follow.  Note: HCV antibody testing is performed on the [employer]  system.   Hepatitis C Virus S/CO Ratio: 0.14 S/CO   Hepatitis B Core IgM Antibody: Nonreact   Hepatitis B Surface Antigen: Nonreact   Hepatitis A IgM Antibody: Nonreact    Microbiology: reviewed     Radiology: reviewed   < from: MR Foot w/wo IV Cont, Left (05.22.23 @ 21:34) >  IMPRESSION:  1.  Extensive soft tissue edema and enhancement involving the third digit   with bony erosive changes centered at the distal interphalangeal joint.   Differential considerations include posttraumatic and infectious   etiologies.    2.  Scattered midfoot and first MTP joint degenerative changes as above.    3.  Second digit linear foreign body.    < end of copied text >    Medications:  acetaminophen     Tablet .. 975 milliGRAM(s) Oral every 6 hours PRN  aluminum hydroxide/magnesium hydroxide/simethicone Suspension 30 milliLiter(s) Oral every 4 hours PRN  amLODIPine   Tablet 5 milliGRAM(s) Oral daily  chlorhexidine 2% Cloths 1 Application(s) Topical <User Schedule>  coronavirus bivalent (EUA) Booster Vaccine (PFIZER) 0.3 milliLiter(s) IntraMuscular once  dorzolamide 2% Ophthalmic Solution 1 Drop(s) Right EYE <User Schedule>  enoxaparin Injectable 40 milliGRAM(s) SubCutaneous every 24 hours  levothyroxine 100 MICROGram(s) Oral daily  melatonin 3 milliGRAM(s) Oral at bedtime PRN  metoprolol succinate ER 50 milliGRAM(s) Oral daily  ondansetron Injectable 4 milliGRAM(s) IV Push every 8 hours PRN    Current Antimicrobials:    Prior/Completed Antimicrobials:  ceFAZolin   IVPB  
[facility]  TRAE Vargas Y. Patel, S. Shah, G. Jem  [phone]  ([phone] - weekdays after 5pm and weekends)    Name: OCTAVIO OTTO  Age/Gender: 82y Male  MRN: [id number]    Interval History:  Patient seen and examined this morning.   Feels pain in R foot better; still with pain in L foot more with weight bearing.   Denies fever or any new complaints noted.  Notes reviewed. No concerning overnight events  Afebrile   Allergies: No Known Drug Allergies  Shrimp (Hives)      Objective:  Vitals:   T(F): 98.1 (05-20-23 @ 05:14), Max: 98.8 (05-19-23 @ 19:42)  HR: 64 (05-20-23 @ 05:14) (64 - 69)  BP: 125/71 (05-20-23 @ 05:14) (125/71 - 133/68)  RR: 18 (05-20-23 @ 05:14) (18 - 18)  SpO2: 99% (05-20-23 @ 05:14) (96% - 99%)  Physical Examination:  General: no acute distress, nontoxic appearing   HEENT: NC/AT, anicteric, neck supple  Respiratory: no acc muscle use, breathing comfortably  Cardiovascular: S1 and S2 present  Gastrointestinal: normal appearing, nondistended  Extremities: dec LE edema, no cyanosis  Skin: no visible rash, erythema resolved     Laboratory Studies:  CBC:                       12.6   8.04  )-----------( 260      ( 19 May 2023 07:02 )             37.7     WBC Trend:  8.04 05-19-23 @ 07:02  10.29 05-18-23 @ 06:49  10.97 05-17-23 @ 07:21  13.26 05-16-23 @ 16:33    CMP: 05-20    136  |  101  |  21  ----------------------------<  108<H>  4.1   |  23  |  1.41<H>    Ca    9.3      20 May 2023 07:08  Phos  3.5     05-20  Mg     2.1     05-20      Creatinine, Serum: 1.41 mg/dL (05-20-23 @ 07:08)  Creatinine, Serum: 1.37 mg/dL (05-19-23 @ 07:01)  Creatinine, Serum: 1.29 mg/dL (05-18-23 @ 06:49)  Creatinine, Serum: 1.45 mg/dL (05-17-23 @ 07:21)  Creatinine, Serum: 1.70 mg/dL (05-16-23 @ 16:33)    Microbiology: reviewed   Radiology: reviewed     Medications:  acetaminophen     Tablet .. 975 milliGRAM(s) Oral every 6 hours PRN  aluminum hydroxide/magnesium hydroxide/simethicone Suspension 30 milliLiter(s) Oral every 4 hours PRN  amLODIPine   Tablet 5 milliGRAM(s) Oral daily  atorvastatin 40 milliGRAM(s) Oral at bedtime  ceFAZolin   IVPB 1000 milliGRAM(s) IV Intermittent every 8 hours  chlorhexidine 2% Cloths 1 Application(s) Topical <User Schedule>  colchicine 0.6 milliGRAM(s) Oral two times a day  coronavirus bivalent (EUA) Booster Vaccine (PFIZER) 0.3 milliLiter(s) IntraMuscular once  dorzolamide 2% Ophthalmic Solution 1 Drop(s) Right EYE <User Schedule>  enoxaparin Injectable 40 milliGRAM(s) SubCutaneous every 24 hours  lactated ringers. 1000 milliLiter(s) IV Continuous <Continuous>  levothyroxine 100 MICROGram(s) Oral daily  melatonin 3 milliGRAM(s) Oral at bedtime PRN  metoprolol succinate ER 50 milliGRAM(s) Oral daily  ondansetron Injectable 4 milliGRAM(s) IV Push every 8 hours PRN    Current Antimicrobials:  ceFAZolin   IVPB 1000 milliGRAM(s) IV Intermittent every 8 hours    Prior/Completed Antimicrobials:  ceFAZolin   IVPB  
[facility]  TRAE Vargas Y. Patel, S. Shah, G. Jem  [phone]  ([phone] - weekdays after 5pm and weekends)    Name: OCTAVIO OTTO  Age/Gender: 82y Male  MRN: [id number]    Interval History:  Patient seen and examined this morning.   Feels better but feels his legs are weak.  Reports LE erythema and pain improved.  Notes reviewed  No concerning overnight events  Afebrile   Allergies: No Known Drug Allergies  Shrimp (Hives)      Objective:  Vitals:   T(F): 98.6 (23 @ 13:15), Max: 99.6 (23 @ 17:27)  HR: 69 (23 @ 13:15) (69 - 78)  BP: 131/64 (23 @ 13:15) (131/64 - 146/76)  RR: 18 (23 @ 13:15) (18 - 18)  SpO2: 97% (23 @ 13:15) (96% - 98%)  Physical Examination:  General: no acute distress  HEENT: NC/AT, anicteric, neck supple  Respiratory: no acc muscle use, breathing comfortably  Cardiovascular: S1 and S2 present  Gastrointestinal: normal appearing, nondistended  Extremities: dec LE edema, no cyanosis  Skin: minimal erythema on L foot     Laboratory Studies:  CBC:                       12.0   10.29 )-----------( 237      ( 18 May 2023 06:49 )             37.1     WBC Trend:  10.29 23 @ 06:49  10.97 23 @ 07:21  13.26 23 @ 16:33    CMP:     133<L>  |  100  |  23  ----------------------------<  124<H>  3.9   |  21<L>  |  1.29    Ca    8.6      18 May 2023 06:49  Phos  2.9       Mg     2.1         TPro  8.6<H>  /  Alb  4.1  /  TBili  0.9  /  DBili  x   /  AST  33  /  ALT  35  /  AlkPhos  67  05-16    Creatinine, Serum: 1.29 mg/dL (23 @ 06:49)  Creatinine, Serum: 1.45 mg/dL (23 @ 07:21)  Creatinine, Serum: 1.70 mg/dL (23 @ 16:33)    LIVER FUNCTIONS - ( 16 May 2023 16:33 )  Alb: 4.1 g/dL / Pro: 8.6 g/dL / ALK PHOS: 67 U/L / ALT: 35 U/L / AST: 33 U/L / GGT: x           Urinalysis Basic - ( 17 May 2023 16:10 )  Color: Light Yellow / Appearance: Clear / S.019 / pH: x  Gluc: x / Ketone: Negative  / Bili: Negative / Urobili: Negative   Blood: x / Protein: Trace / Nitrite: Negative   Leuk Esterase: Negative / RBC: 3 /hpf / WBC 0 /HPF   Sq Epi: x / Non Sq Epi: x / Bacteria: Negative    Microbiology: reviewed   Radiology: reviewed     Medications:  acetaminophen     Tablet .. 975 milliGRAM(s) Oral every 6 hours PRN  aluminum hydroxide/magnesium hydroxide/simethicone Suspension 30 milliLiter(s) Oral every 4 hours PRN  amLODIPine   Tablet 5 milliGRAM(s) Oral daily  atorvastatin 40 milliGRAM(s) Oral at bedtime  ceFAZolin   IVPB 1000 milliGRAM(s) IV Intermittent every 8 hours  chlorhexidine 2% Cloths 1 Application(s) Topical <User Schedule>  colchicine 0.6 milliGRAM(s) Oral two times a day  coronavirus bivalent (EUA) Booster Vaccine (PFIZER) 0.3 milliLiter(s) IntraMuscular once  dorzolamide 2% Ophthalmic Solution 1 Drop(s) Right EYE <User Schedule>  enoxaparin Injectable 40 milliGRAM(s) SubCutaneous every 24 hours  lactated ringers. 1000 milliLiter(s) IV Continuous <Continuous>  levothyroxine 100 MICROGram(s) Oral daily  melatonin 3 milliGRAM(s) Oral at bedtime PRN  metoprolol succinate ER 50 milliGRAM(s) Oral daily  ondansetron Injectable 4 milliGRAM(s) IV Push every 8 hours PRN    Current Antimicrobials:  ceFAZolin   IVPB 1000 milliGRAM(s) IV Intermittent every 8 hours    Prior/Completed Antimicrobials:  ceFAZolin   IVPB  
Patient is a 82y old  Male who presents with a chief complaint of     SUBJECTIVE / OVERNIGHT EVENTS: Patient seen and examined at bedside. denies any CP, SOB, abd pain and n/v. No acute events overnight     ROS:  All other review of systems negative    Allergies    No Known Drug Allergies  Shrimp (Hives)    Intolerances        MEDICATIONS  (STANDING):  amLODIPine   Tablet 5 milliGRAM(s) Oral daily  atorvastatin 40 milliGRAM(s) Oral at bedtime  chlorhexidine 2% Cloths 1 Application(s) Topical <User Schedule>  coronavirus bivalent (EUA) Booster Vaccine (PFIZER) 0.3 milliLiter(s) IntraMuscular once  dorzolamide 2% Ophthalmic Solution 1 Drop(s) Right EYE <User Schedule>  enoxaparin Injectable 40 milliGRAM(s) SubCutaneous every 24 hours  levothyroxine 100 MICROGram(s) Oral daily  metoprolol succinate ER 50 milliGRAM(s) Oral daily    MEDICATIONS  (PRN):  acetaminophen     Tablet .. 975 milliGRAM(s) Oral every 6 hours PRN Temp greater or equal to 38C (100.4F), Mild Pain (1 - 3)  aluminum hydroxide/magnesium hydroxide/simethicone Suspension 30 milliLiter(s) Oral every 4 hours PRN Dyspepsia  melatonin 3 milliGRAM(s) Oral at bedtime PRN Insomnia  ondansetron Injectable 4 milliGRAM(s) IV Push every 8 hours PRN Nausea and/or Vomiting      Vital Signs Last 24 Hrs  T(C): 36.6 (22 May 2023 09:10), Max: 37.2 (21 May 2023 13:09)  T(F): 97.8 (22 May 2023 09:10), Max: 98.9 (21 May 2023 13:09)  HR: 61 (22 May 2023 09:10) (58 - 68)  BP: 137/72 (22 May 2023 09:10) (126/67 - 149/74)  BP(mean): --  RR: 18 (22 May 2023 09:10) (17 - 18)  SpO2: 98% (22 May 2023 09:10) (96% - 98%)    Parameters below as of 22 May 2023 09:10  Patient On (Oxygen Delivery Method): room air      CAPILLARY BLOOD GLUCOSE        I&O's Summary    21 May 2023 07:01  -  22 May 2023 07:00  --------------------------------------------------------  IN: 530 mL / OUT: 600 mL / NET: -70 mL    22 May 2023 07:01  -  22 May 2023 12:53  --------------------------------------------------------  IN: 480 mL / OUT: 0 mL / NET: 480 mL        PHYSICAL EXAM:  GENERAL: NAD, well-developed  HEAD:  Atraumatic, Normocephalic  EYES: EOMI, PERRLA, conjunctiva and sclera clear  NECK: Supple, No JVD  CHEST/LUNG: Clear to auscultation bilaterally; No wheeze  HEART: Regular rate and rhythm; No murmurs, rubs, or gallops  ABDOMEN: Soft, Nontender, Nondistended; Bowel sounds present  EXTREMITIES:  2+ Peripheral Pulses, No clubbing, cyanosis, or edema  NEUROLOGY: AAOx3, non-focal  PSYCH: calm  SKIN: No rashes or lesions    LABS:                        12.8   7.99  )-----------( 313      ( 21 May 2023 07:25 )             38.7     05-22    138  |  96  |  23  ----------------------------<  160<H>  3.9   |  26  |  1.27    Ca    9.7      22 May 2023 11:55  Phos  3.1     05-21  Mg     2.1     05-21    TPro  8.2  /  Alb  3.6  /  TBili  0.4  /  DBili  x   /  AST  86<H>  /  ALT  77<H>  /  AlkPhos  77  05-22              RADIOLOGY & ADDITIONAL TESTS:    Case Discussed with son Raya Ayan [phone]
[facility] Division of Hospital Medicine  Thuy Reddy MD  Available via MS Teams  Pager: [phone]    SUBJECTIVE / OVERNIGHT EVENTS:  - lower extremity pain is more improved, had tenderness at some points, but otherwise not wincing in pain like before. denies n/v/ abd pain/sob/cough.     MEDICATIONS  (STANDING):  amLODIPine   Tablet 5 milliGRAM(s) Oral daily  atorvastatin 40 milliGRAM(s) Oral at bedtime  ceFAZolin   IVPB 1000 milliGRAM(s) IV Intermittent every 8 hours  chlorhexidine 2% Cloths 1 Application(s) Topical <User Schedule>  colchicine 0.6 milliGRAM(s) Oral two times a day  coronavirus bivalent (EUA) Booster Vaccine (PFIZER) 0.3 milliLiter(s) IntraMuscular once  dorzolamide 2% Ophthalmic Solution 1 Drop(s) Right EYE <User Schedule>  enoxaparin Injectable 40 milliGRAM(s) SubCutaneous every 24 hours  levothyroxine 100 MICROGram(s) Oral daily  metoprolol succinate ER 50 milliGRAM(s) Oral daily    MEDICATIONS  (PRN):  acetaminophen     Tablet .. 975 milliGRAM(s) Oral every 6 hours PRN Temp greater or equal to 38C (100.4F), Mild Pain (1 - 3)  aluminum hydroxide/magnesium hydroxide/simethicone Suspension 30 milliLiter(s) Oral every 4 hours PRN Dyspepsia  melatonin 3 milliGRAM(s) Oral at bedtime PRN Insomnia  ondansetron Injectable 4 milliGRAM(s) IV Push every 8 hours PRN Nausea and/or Vomiting      I&O's Summary    20 May 2023 07:01  -  21 May 2023 07:00  --------------------------------------------------------  IN: 480 mL / OUT: 1100 mL / NET: -620 mL    21 May 2023 07:01  -  21 May 2023 16:24  --------------------------------------------------------  IN: 530 mL / OUT: 0 mL / NET: 530 mL        PHYSICAL EXAM:  Vital Signs Last 24 Hrs  T(C): 37.2 (21 May 2023 13:09), Max: 37.2 (20 May 2023 21:28)  T(F): 98.9 (21 May 2023 13:09), Max: 98.9 (20 May 2023 21:28)  HR: 64 (21 May 2023 13:09) (64 - 71)  BP: 126/67 (21 May 2023 13:09) (122/64 - 132/73)  BP(mean): --  RR: 17 (21 May 2023 13:09) (17 - 18)  SpO2: 96% (21 May 2023 13:09) (96% - 97%)    Parameters below as of 21 May 2023 13:09  Patient On (Oxygen Delivery Method): room air      CONSTITUTIONAL: NAD, well-developed, well-groomed  EYES: PERRLA; conjunctiva and sclera clear  ENMT: Moist oral mucosa, no pharyngeal injection or exudates;   NECK: Supple, no palpable masses;   RESPIRATORY: Normal respiratory effort; lungs are clear to auscultation bilaterally  CARDIOVASCULAR: Regular rate and rhythm, normal S1 and S2, no murmur/rub/gallop; No lower extremity edema; Peripheral pulses are 2+ bilaterally  ABDOMEN: Nontender to palpation, normoactive bowel sounds, no rebound/guarding;   MUSCULOSKELETAL:  Normal gait; no clubbing or cyanosis of digits; no joint swelling or tenderness to palpation  PSYCH: A+O to person, place, and time; affect appropriate  NEUROLOGY: CN 2-12 are intact and symmetric; no gross sensory deficits   SKIN: +erythema about the left great toe and across the dorsal service of the left foot. + lateral foot tenderness/pain but this is improved from before    LABS:                        12.8   7.99  )-----------( 313      ( 21 May 2023 07:25 )             38.7     05-21    135  |  99  |  25<H>  ----------------------------<  91  3.9   |  22  |  1.39<H>    Ca    9.4      21 May 2023 07:22  Phos  3.1     05-21  Mg     2.1     05-21    TPro  7.8  /  Alb  3.5  /  TBili  0.6  /  DBili  x   /  AST  62<H>  /  ALT  63<H>  /  AlkPhos  72  05-21    RADIOLOGY & ADDITIONAL TESTS:  New Results Reviewed Today: cbc cmp  New Imaging Personally Reviewed Today: n/a  New Electrocardiogram Personally Reviewed Today: n/a  Prior or Outpatient Records Reviewed Today: n/a    COMMUNICATION:  Care Discussed with Consultants/Other Providers and Details of Discussion: n/a  Discussions with Patient/Family: n/a  PCP Communication: n/a     
Patient is a 82y old  Male who presents with a chief complaint of Cellulitis (23 May 2023 09:47)      SUBJECTIVE / OVERNIGHT EVENTS: Patient seen and examined at bedside. States that he feels well denies any CP, SOB, abd pain and n/v. No acute events overnight     ROS:  All other review of systems negative    Allergies    No Known Drug Allergies  Shrimp (Hives)    Intolerances        MEDICATIONS  (STANDING):  amLODIPine   Tablet 5 milliGRAM(s) Oral daily  chlorhexidine 2% Cloths 1 Application(s) Topical <User Schedule>  coronavirus bivalent (EUA) Booster Vaccine (PFIZER) 0.3 milliLiter(s) IntraMuscular once  dorzolamide 2% Ophthalmic Solution 1 Drop(s) Right EYE <User Schedule>  enoxaparin Injectable 40 milliGRAM(s) SubCutaneous every 24 hours  levothyroxine 100 MICROGram(s) Oral daily  metoprolol succinate ER 50 milliGRAM(s) Oral daily    MEDICATIONS  (PRN):  acetaminophen     Tablet .. 975 milliGRAM(s) Oral every 6 hours PRN Temp greater or equal to 38C (100.4F), Mild Pain (1 - 3)  aluminum hydroxide/magnesium hydroxide/simethicone Suspension 30 milliLiter(s) Oral every 4 hours PRN Dyspepsia  melatonin 3 milliGRAM(s) Oral at bedtime PRN Insomnia  ondansetron Injectable 4 milliGRAM(s) IV Push every 8 hours PRN Nausea and/or Vomiting      Vital Signs Last 24 Hrs  T(C): 36.7 (23 May 2023 08:56), Max: 36.7 (23 May 2023 08:56)  T(F): 98 (23 May 2023 08:56), Max: 98 (23 May 2023 08:56)  HR: 65 (23 May 2023 11:00) (60 - 71)  BP: 157/84 (23 May 2023 11:00) (129/69 - 161/77)  BP(mean): --  RR: 18 (23 May 2023 08:56) (18 - 18)  SpO2: 97% (23 May 2023 11:00) (97% - 98%)    Parameters below as of 23 May 2023 11:00  Patient On (Oxygen Delivery Method): room air      CAPILLARY BLOOD GLUCOSE        I&O's Summary    22 May 2023 07:01  -  23 May 2023 07:00  --------------------------------------------------------  IN: 530 mL / OUT: 1600 mL / NET: -1070 mL    23 May 2023 07:01  -  23 May 2023 13:11  --------------------------------------------------------  IN: 0 mL / OUT: 400 mL / NET: -400 mL        PHYSICAL EXAM:  GENERAL: NAD, well-developed  HEAD:  Atraumatic, Normocephalic  EYES: EOMI, PERRLA, conjunctiva and sclera clear  NECK: Supple, No JVD  CHEST/LUNG: Clear to auscultation bilaterally; No wheeze  HEART: Regular rate and rhythm; No murmurs, rubs, or gallops  ABDOMEN: Soft, Nontender, Nondistended; Bowel sounds present  EXTREMITIES:  2+ Peripheral Pulses, No clubbing, cyanosis, or edema  NEUROLOGY: AAOx3, non-focal  PSYCH: calm  SKIN: No rashes or lesions    LABS:    05-23    138  |  99  |  24<H>  ----------------------------<  97  4.0   |  24  |  1.29    Ca    9.7      23 May 2023 07:15    TPro  8.2  /  Alb  3.6  /  TBili  0.4  /  DBili  x   /  AST  92<H>  /  ALT  89<H>  /  AlkPhos  77  05-23              RADIOLOGY & ADDITIONAL TESTS:    Imaging Personally Reviewed:  MRI  IMPRESSION:  1.  Extensive soft tissue edema and enhancement involving the third digit   with bony erosive changes centered at the distal interphalangeal joint.   Differential considerations include posttraumatic and infectious   etiologies.    2.  Scattered midfoot and first MTP joint degenerative changes as above.    3.  Second digit linear foreign body.    unable to reach son MAINOR left for call back   
[facility] Division of Hospital Medicine  Thuy Reddy MD  Available via MS Teams  Pager: [phone]    SUBJECTIVE / OVERNIGHT EVENTS:  - no events overnight, denies any nausea, vomiting, headaches, shortness of breath, cough, chest pain. states that his lateral foot pain is still present but improved. Still unable to bear weight at this time.     MEDICATIONS  (STANDING):  amLODIPine   Tablet 5 milliGRAM(s) Oral daily  atorvastatin 40 milliGRAM(s) Oral at bedtime  ceFAZolin   IVPB 1000 milliGRAM(s) IV Intermittent every 8 hours  chlorhexidine 2% Cloths 1 Application(s) Topical <User Schedule>  colchicine 0.6 milliGRAM(s) Oral two times a day  coronavirus bivalent (EUA) Booster Vaccine (PFIZER) 0.3 milliLiter(s) IntraMuscular once  dorzolamide 2% Ophthalmic Solution 1 Drop(s) Right EYE <User Schedule>  enoxaparin Injectable 40 milliGRAM(s) SubCutaneous every 24 hours  lactated ringers. 1000 milliLiter(s) (75 mL/Hr) IV Continuous <Continuous>  levothyroxine 100 MICROGram(s) Oral daily  metoprolol succinate ER 50 milliGRAM(s) Oral daily    MEDICATIONS  (PRN):  acetaminophen     Tablet .. 975 milliGRAM(s) Oral every 6 hours PRN Temp greater or equal to 38C (100.4F), Mild Pain (1 - 3)  aluminum hydroxide/magnesium hydroxide/simethicone Suspension 30 milliLiter(s) Oral every 4 hours PRN Dyspepsia  melatonin 3 milliGRAM(s) Oral at bedtime PRN Insomnia  ondansetron Injectable 4 milliGRAM(s) IV Push every 8 hours PRN Nausea and/or Vomiting      I&O's Summary    19 May 2023 07:01  -  20 May 2023 07:00  --------------------------------------------------------  IN: 465 mL / OUT: 1675 mL / NET: -1210 mL    20 May 2023 07:01  -  20 May 2023 12:12  --------------------------------------------------------  IN: 240 mL / OUT: 0 mL / NET: 240 mL        PHYSICAL EXAM:  Vital Signs Last 24 Hrs  T(C): 37.1 (20 May 2023 12:07), Max: 37.1 (19 May 2023 19:42)  T(F): 98.7 (20 May 2023 12:07), Max: 98.8 (19 May 2023 19:42)  HR: 69 (20 May 2023 12:07) (64 - 69)  BP: 133/71 (20 May 2023 12:07) (125/71 - 133/71)  BP(mean): --  RR: 16 (20 May 2023 12:07) (16 - 18)  SpO2: 97% (20 May 2023 12:07) (96% - 99%)    Parameters below as of 20 May 2023 12:07  Patient On (Oxygen Delivery Method): room air      CONSTITUTIONAL: NAD, well-developed, well-groomed  EYES: PERRLA; conjunctiva and sclera clear  ENMT: Moist oral mucosa, no pharyngeal injection or exudates;   NECK: Supple, no palpable masses;   RESPIRATORY: Normal respiratory effort; lungs are clear to auscultation bilaterally  CARDIOVASCULAR: Regular rate and rhythm, normal S1 and S2, no murmur/rub/gallop; No lower extremity edema; Peripheral pulses are 2+ bilaterally  ABDOMEN: Nontender to palpation, normoactive bowel sounds, no rebound/guarding;   MUSCULOSKELETAL:  Normal gait; no clubbing or cyanosis of digits; no joint swelling or tenderness to palpation  PSYCH: A+O to person, place, and time; affect appropriate  NEUROLOGY: CN 2-12 are intact and symmetric; no gross sensory deficits   SKIN: +erythema about the left great toe and across the dorsal service of the left foot. + lateral foot tenderness/pain    LABS:                        12.6   8.04  )-----------( 260      ( 19 May 2023 07:02 )             37.7     05-20    136  |  101  |  21  ----------------------------<  108<H>  4.1   |  23  |  1.41<H>    Ca    9.3      20 May 2023 07:08  Phos  3.5     05-20  Mg     2.1     05-20    RADIOLOGY & ADDITIONAL TESTS:  New Results Reviewed Today: cbc cmp  New Imaging Personally Reviewed Today: n/a  New Electrocardiogram Personally Reviewed Today: n/a  Prior or Outpatient Records Reviewed Today: n/a    COMMUNICATION:  Care Discussed with Consultants/Other Providers and Details of Discussion: n/a  Discussions with Patient/Family: son  PCP Communication: n/a     
[facility] Division of Hospital Medicine  Thuy Reddy MD  Available via MS Teams  Pager: [phone]    SUBJECTIVE / OVERNIGHT EVENTS:  - no events overnight, this AM, has been having difficulty ambulating due to pain, especially at the bilateral ankles and left toe. Otherwise erythema has improved. Denies any nausea, vomiting, headaches, shortness of breath, cough.     ADDITIONAL REVIEW OF SYSTEMS:    MEDICATIONS  (STANDING):  amLODIPine   Tablet 5 milliGRAM(s) Oral daily  atorvastatin 40 milliGRAM(s) Oral at bedtime  ceFAZolin   IVPB 1000 milliGRAM(s) IV Intermittent every 8 hours  chlorhexidine 2% Cloths 1 Application(s) Topical <User Schedule>  colchicine 0.6 milliGRAM(s) Oral two times a day  coronavirus bivalent (EUA) Booster Vaccine (PFIZER) 0.3 milliLiter(s) IntraMuscular once  dorzolamide 2% Ophthalmic Solution 1 Drop(s) Right EYE <User Schedule>  enoxaparin Injectable 40 milliGRAM(s) SubCutaneous every 24 hours  lactated ringers. 1000 milliLiter(s) (75 mL/Hr) IV Continuous <Continuous>  levothyroxine 100 MICROGram(s) Oral daily  metoprolol succinate ER 50 milliGRAM(s) Oral daily    MEDICATIONS  (PRN):  acetaminophen     Tablet .. 975 milliGRAM(s) Oral every 6 hours PRN Temp greater or equal to 38C (100.4F), Mild Pain (1 - 3)  aluminum hydroxide/magnesium hydroxide/simethicone Suspension 30 milliLiter(s) Oral every 4 hours PRN Dyspepsia  melatonin 3 milliGRAM(s) Oral at bedtime PRN Insomnia  ondansetron Injectable 4 milliGRAM(s) IV Push every 8 hours PRN Nausea and/or Vomiting      I&O's Summary    17 May 2023 07:01  -  18 May 2023 07:00  --------------------------------------------------------  IN: 1150 mL / OUT: 1400 mL / NET: -250 mL        PHYSICAL EXAM:  Vital Signs Last 24 Hrs  T(C): 37.3 (18 May 2023 05:44), Max: 37.6 (17 May 2023 17:27)  T(F): 99.1 (18 May 2023 05:44), Max: 99.6 (17 May 2023 17:27)  HR: 78 (18 May 2023 05:44) (72 - 78)  BP: 133/67 (18 May 2023 05:44) (133/67 - 146/76)  BP(mean): --  RR: 18 (18 May 2023 05:44) (18 - 18)  SpO2: 97% (18 May 2023 05:44) (96% - 98%)    Parameters below as of 18 May 2023 05:44  Patient On (Oxygen Delivery Method): room air      CONSTITUTIONAL: NAD, well-developed, well-groomed  EYES: PERRLA; conjunctiva and sclera clear  ENMT: Moist oral mucosa, no pharyngeal injection or exudates;   NECK: Supple, no palpable masses;   RESPIRATORY: Normal respiratory effort; lungs are clear to auscultation bilaterally  CARDIOVASCULAR: Regular rate and rhythm, normal S1 and S2, no murmur/rub/gallop; No lower extremity edema; Peripheral pulses are 2+ bilaterally  ABDOMEN: Nontender to palpation, normoactive bowel sounds, no rebound/guarding;   MUSCULOSKELETAL:  Normal gait; no clubbing or cyanosis of digits; no joint swelling or tenderness to palpation  PSYCH: A+O to person, place, and time; affect appropriate  NEUROLOGY: CN 2-12 are intact and symmetric; no gross sensory deficits   SKIN: +erythema about the left great toe and across the dorsal service of the left foot. there is tenderness about the right toe to palpation.     LABS:                        12.0   10.29 )-----------( 237      ( 18 May 2023 06:49 )             37.1     05-18    133<L>  |  100  |  23  ----------------------------<  124<H>  3.9   |  21<L>  |  1.29    Ca    8.6      18 May 2023 06:49  Phos  2.9       Mg     2.1         TPro  8.6<H>  /  Alb  4.1  /  TBili  0.9  /  DBili  x   /  AST  33  /  ALT  35  /  AlkPhos  67  05-16    PT/INR - ( 16 May 2023 16:33 )   PT: 14.1 sec;   INR: 1.22 ratio         PTT - ( 16 May 2023 16:33 )  PTT:30.8 sec      Urinalysis Basic - ( 17 May 2023 16:10 )    Color: Light Yellow / Appearance: Clear / S.019 / pH: x  Gluc: x / Ketone: Negative  / Bili: Negative / Urobili: Negative   Blood: x / Protein: Trace / Nitrite: Negative   Leuk Esterase: Negative / RBC: 3 /hpf / WBC 0 /HPF   Sq Epi: x / Non Sq Epi: x / Bacteria: Negative            RADIOLOGY & ADDITIONAL TESTS:  New Results Reviewed Today: cbc cmp  New Imaging Personally Reviewed Today: n/a  New Electrocardiogram Personally Reviewed Today: n/a  Prior or Outpatient Records Reviewed Today: n/a     COMMUNICATION:  Care Discussed with Consultants/Other Providers and Details of Discussion: n/a  Discussions with Patient/Family: son  PCP Communication: n/a

## 2024-05-15 NOTE — PHYSICAL THERAPY INITIAL EVALUATION ADULT - PREDICTED DURATION OF THERAPY (DAYS/WKS), PT EVAL
[FreeTextEntry1] : 1. Chest Pain: patient with risk factors for CAD. Recommend pharmacologic nuclear stress testing. Patient states she already had echocardiogram through PCP. Will attempt to obtain report.  2. HTN: Goal BP less than 130/80. Recommend low salt diet. Continue lisinopril (doesn't know dose, but will get it to us).  3. HLD: continue rosuvastatin (doesn't know dose, but will get it to us).  Given smoking history advising abdominal aortic duplex to screen for AAA. Follow up after testing. [EKG obtained to assist in diagnosis and management of assessed problem(s)] : EKG obtained to assist in diagnosis and management of assessed problem(s) 2 weeks